# Patient Record
Sex: FEMALE | Race: WHITE | Employment: FULL TIME | URBAN - METROPOLITAN AREA
[De-identification: names, ages, dates, MRNs, and addresses within clinical notes are randomized per-mention and may not be internally consistent; named-entity substitution may affect disease eponyms.]

---

## 2017-01-16 ENCOUNTER — OFFICE VISIT (OUTPATIENT)
Dept: NEUROLOGY | Age: 57
End: 2017-01-16

## 2017-01-16 ENCOUNTER — TELEPHONE (OUTPATIENT)
Dept: NEUROLOGY | Age: 57
End: 2017-01-16

## 2017-01-16 VITALS
HEIGHT: 63 IN | TEMPERATURE: 97.5 F | OXYGEN SATURATION: 98 % | BODY MASS INDEX: 27.71 KG/M2 | SYSTOLIC BLOOD PRESSURE: 122 MMHG | RESPIRATION RATE: 16 BRPM | DIASTOLIC BLOOD PRESSURE: 78 MMHG | WEIGHT: 156.4 LBS | HEART RATE: 81 BPM

## 2017-01-16 DIAGNOSIS — R41.3 MEMORY LOSS: ICD-10-CM

## 2017-01-16 DIAGNOSIS — H93.25 AUDITORY PROCESSING DISORDER: ICD-10-CM

## 2017-01-16 DIAGNOSIS — R41.3 MEMORY LOSS: Primary | ICD-10-CM

## 2017-01-16 DIAGNOSIS — H93.25 AUDITORY PROCESSING DISORDER: Primary | ICD-10-CM

## 2017-01-16 RX ORDER — VITAMIN E 268 MG
CAPSULE ORAL
COMMUNITY

## 2017-01-16 RX ORDER — THYROID,PORK 130 MG
130 TABLET ORAL
Refills: 1 | COMMUNITY
Start: 2017-01-09 | End: 2019-03-18

## 2017-01-16 RX ORDER — CHOLECALCIFEROL (VITAMIN D3) 125 MCG
CAPSULE ORAL DAILY
COMMUNITY

## 2017-01-16 RX ORDER — SELENIUM 100 MCG
TABLET ORAL DAILY
COMMUNITY

## 2017-01-16 NOTE — PROGRESS NOTES
[address] [facility]. [address]   [place] [address] [facility] [address] [place], [facility]    [facility] [place]   [phone] Fax             Referring: Marissa Torres MD      Chief Complaint   Patient presents with    Memory Loss     new patient     59-year-old right-handed woman who presents today for evaluation of memory loss. She says that her symptoms began about 10 years ago when she noticed decline in her ability to function cognitively. She says that her  believes over the years she is about the same but she perceives that she is worse. Back in 2008 she had a neuropsych evaluation which did show a mixed normal and abnormal result demonstrating some auditory processing issues. She has not had that repeated. She has not had her B12 checked. She says that she repeats the same question until the same story. She gives example today that she prepared for this appointment by putting in the address and her GPS device and she says she was on her way here when she realized she did not know where she was going and she did not have her GPS on. She then had to figure out how to turn on the GPS and get things working so that she could get here. She says that she goes to the grocery store and does not have it written down she will not buy it. 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 She has trouble with computer applications. She has trouble with higher level math. She works as a  and she works with  first and third grade students. She says there is a  position open but she is not able to fulfill the duties secondary to her cognitive issues. She says her family is frustrated and getting increasingly frustrated. She denies any focal weakness. No loss or alteration in consciousness.   She did have her thyroid medication adjusted recently as she was having some palpitations. Her levels were too high and the medication was decreased. Her thyroid was resected in 1995 secondary to a question of thyroid cancer but the pathology came back normal.  She had a question of obstructive sleep apnea had a equivocal polysomnogram per her report and she lost 80 pounds and had subsequent testing performed and she does not have obstructive sleep apnea. She does have long-standing insomnia and she has tried multiple medications and seen at least to sleep doctors but nothing has helped her. Past Medical History   Diagnosis Date    Headache     Hypertension     Hypothyroidism     Insomnia     Melanoma ([address]     Muscle pain        Past Surgical History   Procedure Laterality Date    Hx cholecystectomy      Hx appendectomy      Hx heent      Hx gyn         Current Outpatient Prescriptions   Medication Sig Dispense Refill    vitamin E (AQUA GEMS) 400 unit capsule Take  by mouth. 4 times a week      cholecalciferol, vitamin D3, (VITAMIN D3) 2,000 unit tab Take  by mouth daily.  selenium 100 mcg tab Take  by mouth daily.  WP THYROID 130 mg tab 130 mg. 1/2 tab bid  1    multivitamin (ONE A DAY) tablet Take 1 Tab by mouth daily.  magnesium oxide (MAG-OX) 400 mg tablet Take 400 mg by mouth daily.  levothyroxine (SYNTHROID) 112 mcg tablet Take  by mouth Daily (before breakfast).  krill-omega-3-dha-epa-lipids 519-61-36-16 mg cap Take  by mouth.  liothyronine (CYTOMEL) 5 mcg tablet Take 5 mcg by mouth two (2) times a day.           Allergies   Allergen Reactions    Hydrochlorothiazide (Bulk) Other (comments)     Makes bp elevated & alters thyroid levels    Codeine Nausea Only       Social History   Substance Use Topics    Smoking status: Former Smoker    Smokeless tobacco: Never Used    Alcohol use 0.0 oz/week     0 Standard drinks or equivalent per week      Comment: 2x per year       Family History   Problem Relation Age of Onset    Dementia Mother     Coronary Artery Disease Father     Heart Attack Father     Cancer Father     Heart Disease Father     Stroke Father     Breast Cancer Maternal Aunt     Parkinson's Disease Maternal Aunt     Mental Retardation Sister     Heart Disease Maternal Grandmother     Stroke Maternal Grandmother     Heart Disease Maternal Grandfather     Heart Disease Paternal Grandmother     Stroke Paternal Grandmother     Heart Disease Paternal Grandfather     Headache Other      Review of Systems  As above. Remainder of comprehensive ROS is negative    Examination  Visit Vitals    /78    Pulse 81    Temp 97.5 °F (36.4 °C)    Resp 16    Ht 5' 3\" (1.6 m)    Wt 70.9 kg (156 lb 6.4 oz)    SpO2 98%    BMI 27.71 kg/m2     Pleasant, well appearing. No icterus. Oropharynx clear. Supple neck without bruit. Heart regular. No murmur. No edema. Neurologically, she is awake, alert, and oriented with normal speech and language. She is able to discuss her medical history. She is able to discuss her medications. She is able to discuss current events. Intact cranial nerves 2-12. No nystagmus. Visual fields full to confrontation. Disk margins are flat bilaterally. She has normal bulk and tone. She has no abnormal movement. She has no pronation or drift. She generates full strength in the upper and lower extremities to direct confrontational testing. Reflexes are symmetrical in the upper and lower extremities bilaterally. Her toes are down bilaterally. No Rich. Finger nose finger and rapid alternating movements are normal.  Steady gait. No sensory deficit to primary modalities.       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 At this point given the period of time between her first evaluation and now we will proceed with an MRI of the brain looking for anatomic abnormalities in the temporal lobe or the frontal lobe specifically evaluating the hippocampus as well as the frontal region for evidence of any anatomic abnormality that would predispose her to executive functioning/memory issues as well as the auditory association cortex. Additionally, we will proceed with an EEG as well as carotid Doppler. She has not had a B12 checked we will check that as well. Repeat Neuropsych testing. Follow after testing completing    This note was created using voice recognition software. Despite editing, there may be syntax errors. This note will not be viewable in [address]

## 2017-01-16 NOTE — TELEPHONE ENCOUNTER
Patient was here and wanted to know if Dr. Aditya Rosales sent in anything she could take before going in for her MRI due to [address]

## 2017-01-16 NOTE — PATIENT INSTRUCTIONS
Information Regarding Testing     If you have physican order for a test or a medication denied by your insurance company, this does not mean the test or medication is not appropriate for you as that is a medical decision, not a decision to be made by an insurance company representative or by an [facility] physician who has not interviewed and examined you. This is a decision to be made between you and your physician. The denial of services is a contractual matter between you and your insurance company, not an issue between your physician and the insurance company. If your test or medication is denied, you can take the following steps to help resolve the issue:    1. File a complaint with the [facility] regarding your insurance company's denial of services ordered for you. You can do this either by calling them directly or by completing an on-line complaint form on the [employer]. This can be found at [email or web address]    2. Also file a formal complaint with your insurance company and ask to have the name of the person denying the service so that you may explore a legal option should you be harmed by this denial of service. Again, the fact the insurance company will not pay for the service does not mean it is not medically necessary and I would encourage you to follow through with the plan that was made with your physician    3. File a written complaint with your employer so your employer and benefit manager is aware of the poor coverage they are providing their employees. If you have medicare/medicaid, complain to your representative in the House and to your Doroteo Faustin.         [facility]   Statement to Patients  April 1, 2014      In an effort to ensure the large volume of patient prescription refills is processed in the most efficient and expeditious manner, we are asking our patients to assist us by calling your Pharmacy for all prescription refills, this will include also your  Mail Order Pharmacy. The pharmacy will contact our office electronically to continue the refill process. Please do not wait until the last minute to call your pharmacy. We need at least 48 hours (2days) to fill prescriptions. We also encourage you to call your pharmacy before going to  your prescription to make sure it is ready. With regard to controlled substance prescription refill requests (narcotic refills) that need to be picked up at our office, we ask your cooperation by providing us with at least 72 hours (3days) notice that you will need a refill. We will not refill narcotic prescription refill requests after 4:00pm on any weekday, Monday through Thursday, or after 2:00pm on Fridays, or on the weekends. We encourage everyone to explore another way of getting your prescription refill request processed using [email or web address], our patient web portal through our electronic medical record system. [email or web address] is an efficient and effective way to communicate your medication request directly to the office and  downloadable as an vitkoriya on your smart phone . [email or web address] also features a review functionality that allows you to view your medication list as well as leave messages for your physician. Are you ready to get connected? If so please review the attatched instructions or speak to any of our staff to get you set up right away! Thank you so much for your cooperation. Should you have any questions please contact our Practice Administrator. The Physicians and Staff,  [facility]         If we have ordered testing for you, we do not call patients with results and we do not give test results over the phone. We schedule follow up appointments so that your results can be discussed in person and any questions you have regarding them may be addressed.   If something of concern is revealed on your test, we will call you for a sooner follow up appointment. Additionally, results may be found by using the My Chart feature and one of our patient service representatives at the  can give you instructions on how to access this feature of our electronic medical record system. Thank you for choosing [facility] for your     care. You may receive a survey about your visit. We appreciate you taking time     to complete this survey as we use your feedback to improve our services. We     realize we are not perfect, but we strive to provide excellent care. Learning About Living Perroy  What is a living will? A living will is a legal form you use to write down the kind of care you want at the end of your life. It is used by the health professionals who will treat you if you aren't able to decide for yourself. If you put your wishes in writing, your loved ones and others will know what kind of care you want. They won't need to guess. This can ease your mind and be helpful to others. A living will is not the same as an estate or property will. An estate will explains what you want to happen with your money and property after you die. Is a living will a legal document? A living will is a legal document. Each state has its own laws about living green. If you move to another state, make sure that your living will is legal in the state where you now live. Or you might use a universal form that has been approved by many states. This kind of form can sometimes be completed and stored online. Your electronic copy will then be available wherever you have a connection to the Internet. In most cases, doctors will respect your wishes even if you have a form from a different state. · You don't need an  to complete a living will.  But legal advice can be helpful if your state's laws are unclear, your health history is complicated, or your family can't agree on what should be in your living will.  · You can change your living will at any time. Some people find that their wishes about end-of-life care change as their health changes. · In addition to making a living will, think about completing a medical power of  form. This form lets you name the person you want to make end-of-life treatment decisions for you (your \"health care agent\") if you're not able to. Many hospitals and nursing homes will give you the forms you need to complete a living will and a medical power of . · Your living will is used only if you can't make or communicate decisions for yourself anymore. If you become able to make decisions again, you can accept or refuse any treatment, no matter what you wrote in your living will. · Your state may offer an online registry. This is a place where you can store your living will online so the doctors and nurses who need to treat you can find it right away. What should you think about when creating a living will? Talk about your end-of-life wishes with your family members and your doctor. Let them know what you want. That way the people making decisions for you won't be surprised by your choices. Think about these questions as you make your living will:  · Do you know enough about life support methods that might be used? If not, talk to your doctor so you know what might be done if you can't breathe on your own, your heart stops, or you're unable to swallow. · What things would you still want to be able to do after you receive life-support methods? Would you want to be able to walk? To speak? To eat on your own? To live without the help of machines? · If you have a choice, where do you want to be cared for? In your home? At a hospital or nursing home? · Do you want certain Congregational practices performed if you become very ill? · If you have a choice at the end of your life, where would you prefer to die? At home? In a hospital or nursing home? Somewhere else?   · Would you prefer to be buried or cremated? · Do you want your organs to be donated after you die? What should you do with your living will? · Make sure that your family members and your health care agent have copies of your living will. · Give your doctor a copy of your living will to keep in your medical record. If you have more than one doctor, make sure that each one has a copy. · You may want to put a copy of your living will where it can be easily found. Where can you learn more? Go to http://marko-francoise.info/. Enter S490 in the search box to learn more about \"Learning About Living Perroy. \"  Current as of: February 24, 2016  Content Version: 11.1  © 9048-7847 [employer]. Care instructions adapted under license by [employer] (which disclaims liability or warranty for this information). If you have questions about a medical condition or this instruction, always ask your healthcare professional. [address] any warranty or liability for your use of this information.

## 2017-01-16 NOTE — PROGRESS NOTES
New patient presenting with memory loss. Has seen Dr Daksha Prescott in 2008 and had neuropsych testing. Patient reports difficulty retaining and processing information. Short term memory difficulties.

## 2017-01-16 NOTE — MR AVS SNAPSHOT
Visit Information Date & Time Provider Department Dept. Phone Encounter #  
 1/16/2017  8:00 AM Rufus Fitzpatrick MD Neurology [facility] [place]  Follow-up Instructions Return for After tests. Upcoming Health Maintenance Date Due Hepatitis C Screening 1960 DTaP/Tdap/Td series (1 - Tdap) 10/6/1981 PAP AKA CERVICAL CYTOLOGY 10/6/1981 FOBT Q 1 YEAR AGE 50-75 10/6/2010 INFLUENZA AGE 9 TO ADULT 8/1/2016 BREAST CANCER SCRN MAMMOGRAM 10/18/2018 Allergies as of 1/16/2017  Review Complete On: 1/16/2017 By: Rufus Fitzpatrick MD  
  
 Severity Noted Reaction Type Reactions Hydrochlorothiazide (Bulk)  02/06/2012    Other (comments) Makes bp elevated & alters thyroid levels Codeine Low 02/06/2012    Nausea Only Current Immunizations  Never Reviewed No immunizations on file. Not reviewed this visit You Were Diagnosed With   
  
 Codes Comments Memory loss    -  Primary ICD-10-CM: R41.3 ICD-9-CM: 780.93 Auditory processing disorder     ICD-10-CM: H93.25 ICD-9-CM: 388.45 Vitals BP Pulse Temp Resp Height(growth percentile) Weight(growth percentile) 122/78 81 97.5 °F (36.4 °C) 16 5' 3\" (1.6 m) 156 lb 6.4 oz (70.9 kg) SpO2 BMI Smoking Status 98% 27.71 kg/m2 Former Smoker BMI and BSA Data Body Mass Index Body Surface Area  
 27.71 kg/m 2 1.78 m 2 Preferred Pharmacy Pharmacy Name Phone [id number], [facility] [facility] [address] [phone] Your Updated Medication List  
  
   
This list is accurate as of: 1/16/17  8:28 AM.  Always use your most recent med list.  
  
  
  
  
 krill-omega-3-dha-epa-lipids 357-32-49-50 mg Cap Take  by mouth.  
  
 levothyroxine 112 mcg tablet Commonly known as:  SYNTHROID Take  by mouth Daily (before breakfast). liothyronine 5 mcg tablet Commonly known as:  CYTOMEL Take 5 mcg by mouth two (2) times a day. magnesium oxide 400 mg tablet Commonly known as:  MAG-OX Take 400 mg by mouth daily. multivitamin tablet Commonly known as:  ONE A DAY Take 1 Tab by mouth daily. selenium 100 mcg Tab Take  by mouth daily. VITAMIN D3 2,000 unit Tab Generic drug:  cholecalciferol (vitamin D3) Take  by mouth daily. vitamin E 400 unit capsule Commonly known as:  [address] Take  by mouth. 4 times a week WP THYROID 130 mg Tab Generic drug:  Thyroid (Pork) 130 mg. 1/2 tab bid We Performed the Following REFERRAL TO NEUROPSYCHOLOGY [PSY08 Custom] Comments:  
 Please evaluate patient for cognitive decline--prev test 2008 in scanned media VITAMIN B12 [id number] CPT(R)] Follow-up Instructions Return for After tests. To-Do List   
 01/16/2017 Imaging:  DUPLEX CAROTID BILATERAL AMB NEURO   
  
 01/16/2017 Neurology:  EEG   
  
 01/16/2017 Imaging:  MRI BRAIN W WO CONT Referral Information Referral ID Referred By Referred To  
  
 [id number] BINH, [address], PsyD [place] [address] [facility], [address] [facility] Phone: [phone] Fax: [phone] Visits Status Start Date End Date 1 New Request 1/16/17 1/16/18 If your referral has a status of pending review or denied, additional information will be sent to support the outcome of this decision. Patient Instructions Information Regarding Testing If you have physican order for a test or a medication denied by your insurance company, this does not mean the test or medication is not appropriate for you as that is a medical decision, not a decision to be made by an insurance company representative or by an [facility] physician who has not interviewed and examined you. This is a decision to be made between you and your physician. The denial of services is a contractual matter between you and your insurance company, not an issue between your physician and the insurance company. If your test or medication is denied, you can take the following steps to help resolve the issue: 1. File a complaint with the [facility] of Insurance regarding your insurance company's denial of services ordered for you. You can do this either by calling them directly or by completing an on-line complaint form on the [employer]. This can be found at [email or web address]" 2. Also file a formal complaint with your insurance company and ask to have the name of the person denying the service so that you may explore a legal option should you be harmed by this denial of service. Again, the fact the insurance company will not pay for the service does not mean it is not medically necessary and I would encourage you to follow through with the plan that was made with your physician 3. File a written complaint with your employer so your employer and benefit manager is aware of the poor coverage they are providing their employees. If you have medicare/medicaid, complain to your representative in the House and to your Doroteo Faustin. PRESCRIPTION REFILL POLICY [facility] Statement to Patients April 1, 2014 In an effort to ensure the large volume of patient prescription refills is processed in the most efficient and expeditious manner, we are asking our patients to assist us by calling your Pharmacy for all prescription refills, this will include also your  Mail Order Pharmacy. The pharmacy will contact our office electronically to continue the refill process. Please do not wait until the last minute to call your pharmacy. We need at least 48 hours (2days) to fill prescriptions. We also encourage you to call your pharmacy before going to  your prescription to make sure it is ready. With regard to controlled substance prescription refill requests (narcotic refills) that need to be picked up at our office, we ask your cooperation by providing us with at least 72 hours (3days) notice that you will need a refill. We will not refill narcotic prescription refill requests after 4:00pm on any weekday, Monday through Thursday, or after 2:00pm on Fridays, or on the weekends. We encourage everyone to explore another way of getting your prescription refill request processed using [employer], our patient web portal through our electronic medical record system. [employer] is an efficient and effective way to communicate your medication request directly to the office and  downloadable as an viktoriya on your smart phone . [employer] also features a review functionality that allows you to view your medication list as well as leave messages for your physician. Are you ready to get connected? If so please review the attatched instructions or speak to any of our staff to get you set up right away! Thank you so much for your cooperation. Should you have any questions please contact our Practice Administrator. The Physicians and Staff,  [facility] If we have ordered testing for you, we do not call patients with results and we do not give test results over the phone. We schedule follow up appointments so that your results can be discussed in person and any questions you have regarding them may be addressed. If something of concern is revealed on your test, we will call you for a sooner follow up appointment. Additionally, results may be found by using the My Chart feature and one of our patient service representatives at the  can give you instructions on how to access this feature of our electronic medical record system. Thank you for choosing [facility] and [facility] for your  
 
care. You may receive a survey about your visit.   We appreciate you taking time  
 
to complete this survey as we use your feedback to improve our services. We  
 
realize we are not perfect, but we strive to provide excellent care. Merlin Adam [address] What is a living will? A living will is a legal form you use to write down the kind of care you want at the end of your life. It is used by the health professionals who will treat you if you aren't able to decide for yourself. If you put your wishes in writing, your loved ones and others will know what kind of care you want. They won't need to guess. This can ease your mind and be helpful to others. A living will is not the same as an estate or property will. An estate will explains what you want to happen with your money and property after you die. Is a living will a legal document? A living will is a legal document. Each state has its own laws about living green. If you move to another state, make sure that your living will is legal in the state where you now live. Or you might use a universal form that has been approved by many states. This kind of form can sometimes be completed and stored online. Your electronic copy will then be available wherever you have a connection to the Internet. In most cases, doctors will respect your wishes even if you have a form from a different state. · You don't need an  to complete a living will. But legal advice can be helpful if your state's laws are unclear, your health history is complicated, or your family can't agree on what should be in your living will. · You can change your living will at any time. Some people find that their wishes about end-of-life care change as their health changes. · In addition to making a living will, think about completing a medical power of  form.  This form lets you name the person you want to make end-of-life treatment decisions for you (your \"health care agent\") if you're not able to. Many hospitals and nursing homes will give you the forms you need to complete a living will and a medical power of . · Your living will is used only if you can't make or communicate decisions for yourself anymore. If you become able to make decisions again, you can accept or refuse any treatment, no matter what you wrote in your living will. · Your state may offer an online registry. This is a place where you can store your living will online so the doctors and nurses who need to treat you can find it right away. What should you think about when creating a living will? Talk about your end-of-life wishes with your family members and your doctor. Let them know what you want. That way the people making decisions for you won't be surprised by your choices. Think about these questions as you make your living will: · Do you know enough about life support methods that might be used? If not, talk to your doctor so you know what might be done if you can't breathe on your own, your heart stops, or you're unable to swallow. · What things would you still want to be able to do after you receive life-support methods? Would you want to be able to walk? To speak? To eat on your own? To live without the help of machines? · If you have a choice, where do you want to be cared for? In your home? At a hospital or nursing home? · Do you want certain Hoahaoism practices performed if you become very ill? · If you have a choice at the end of your life, where would you prefer to die? At home? In a hospital or nursing home? Somewhere else? · Would you prefer to be buried or cremated? · Do you want your organs to be donated after you die? What should you do with your living will? · Make sure that your family members and your health care agent have copies of your living will.  
· Give your doctor a copy of your living will to keep in your medical record. If you have more than one doctor, make sure that each one has a copy. · You may want to put a copy of your living will where it can be easily found. Where can you learn more? Go to http://marko-francoise.info/. Enter H664 in the search box to learn more about \"Learning About Living Tereza. \" Current as of: February 24, 2016 Content Version: 11.1 © 9631-6310 [employer]. Care instructions adapted under license by [employer] (which disclaims liability or warranty for this information). If you have questions about a medical condition or this instruction, always ask your healthcare professional. [address] any warranty or liability for your use of this information. Introducing [facility]! Tahira Alonso introduces [employer] patient portal. Now you can access parts of your medical record, email your doctor's office, and request medication refills online. 1. In your internet browser, go to [email or web address]/[email or web address] 2. Click on the First Time User? Click Here link in the Sign In box. You will see the New Member Sign Up page. 3. Enter your [employer] Access Code exactly as it appears below. You will not need to use this code after youve completed the sign-up process. If you do not sign up before the expiration date, you must request a new code. · [employer] Access Code: [email or web address] Expires: 4/16/2017  8:28 AM 
 
4. Enter the last four digits of your Social Security Number (xxxx) and Date of Birth (mm/dd/yyyy) as indicated and click Submit. You will be taken to the next sign-up page. 5. Create a [employer] ID. This will be your [employer] login ID and cannot be changed, so think of one that is secure and easy to remember. 6. Create a [employer] password. You can change your password at any time. 7. Enter your Password Reset Question and Answer. This can be used at a later time if you forget your password. 8. Enter your e-mail address. You will receive e-mail notification when new information is available in [address] 9. Click Sign Up. You can now view and download portions of your medical record. 10. Click the Download Summary menu link to download a portable copy of your medical information. If you have questions, please visit the Frequently Asked Questions section of the [employer] website. Remember, [employer] is NOT to be used for urgent needs. For medical emergencies, dial 911. Now available from your iPhone and Android! Please provide this summary of care documentation to your next provider. Your primary care clinician is listed as Edwar Wheatley. If you have any questions after today's visit, please call [phone].

## 2017-01-17 RX ORDER — DIAZEPAM 10 MG/1
TABLET ORAL
Qty: 1 TAB | Refills: 0 | OUTPATIENT
Start: 2017-01-17 | End: 2017-06-27

## 2017-01-17 NOTE — TELEPHONE ENCOUNTER
Spoke to patient and informed her per Dr Gloria Lay new order for Valium prior to MRI. Patient request script be sent to [facility]. VO called to pharmacy. Spoke to Notice.

## 2017-01-26 ENCOUNTER — HOSPITAL ENCOUNTER (OUTPATIENT)
Dept: MRI IMAGING | Age: 57
Discharge: HOME | End: 2017-01-26
Attending: PSYCHIATRY & NEUROLOGY
Payer: COMMERCIAL

## 2017-01-26 ENCOUNTER — HOSPITAL ENCOUNTER (OUTPATIENT)
Dept: NEUROLOGY | Age: 57
Discharge: HOME | End: 2017-01-26
Attending: PSYCHIATRY & NEUROLOGY
Payer: COMMERCIAL

## 2017-01-26 DIAGNOSIS — H93.25 AUDITORY PROCESSING DISORDER: ICD-10-CM

## 2017-01-26 DIAGNOSIS — R41.3 MEMORY LOSS: ICD-10-CM

## 2017-01-26 PROCEDURE — 70553 MRI BRAIN STEM W/O & W/DYE: CPT

## 2017-01-26 PROCEDURE — A9585 GADOBUTROL INJECTION: HCPCS | Performed by: RADIOLOGY

## 2017-01-26 PROCEDURE — 95816 EEG AWAKE AND DROWSY: CPT

## 2017-01-26 PROCEDURE — 74011250636 HC RX REV CODE- 250/636: Performed by: RADIOLOGY

## 2017-01-26 RX ADMIN — GADOBUTROL 6.94 ML: 604.72 INJECTION INTRAVENOUS at 15:07

## 2017-01-31 NOTE — PROCEDURES
Carotid Doppler:      Date:  01/16/17    Requesting Physician:  Carlos Alberto Brandt. MD Little     Indication:  Stenosis. B-mode imaging reveals mild plaque at the bifurcations extending into the internal carotid artery segments bilaterally. Doppler spectral analysis reveals no significantly elevated velocities. Vertebral artery flow antegrade bilaterally. Interpretation:  Plaque as noted. Stenosis estimated at 1-15% bilateral ICA.

## 2017-01-31 NOTE — PROCEDURES
[facility] [place] [address]   [address] [facility], [address]   ROUTINE EEG REPORT       Name:  Horace Berman   MR#:  [id number]   :  1960   Account #:  [de-identified]    Date of Procedure:  2017   Date of Adm:  2017       REQUESTING PHYSICIAN: Musa Fitch MD    INDICATIONS: An EEG is requested in this 51-year-old woman with   confusional spells, to evaluate for epileptiform abnormalities. MEDICATIONS: Leodis Leash to be thyroid. DESCRIPTION: This tracing is obtained during the awake and drowsy   states. During wakefulness, there are very brief intermittent runs of   posteriorly dominant and symmetrical low to medium amplitude 9-10   cycle/sec activities, which attenuate with eye opening. Lower voltage,   faster frequency activities are seen symmetrically over the anterior   head regions. Hyperventilation is performed for 3 minutes with excellent effort and   little alters of the tracing. Intermittent photic stimulation induces symmetric posterior driving   responses. During drowsiness, the background rhythms attenuate and are   replaced with diffuse symmetric theta range activities. There is the later   emergence of symmetric vertex sharp waves. Later stages of sleep are   not obtained. INTERPRETATION: This EEG recorded during the awake and drowsy   states is normal. No epileptiform abnormalities are seen.         MD HUGO Shepard / Stalin.Kassie   D:  2017   08:34   T:  2017   01:20   Job #:  [id number]

## 2017-02-03 ENCOUNTER — TELEPHONE (OUTPATIENT)
Dept: MEDSURG UNIT | Age: 57
End: 2017-02-03

## 2017-02-03 NOTE — TELEPHONE ENCOUNTER
Pt needs to know the result for her MRI and EEG test please. Pt has the CD for her MRI and she does not like to rodger until Dr see her. Please call to advice [phone] .  Thank you

## 2017-02-08 ENCOUNTER — TELEPHONE (OUTPATIENT)
Dept: NEUROLOGY | Age: 57
End: 2017-02-08

## 2017-04-17 ENCOUNTER — OFFICE VISIT (OUTPATIENT)
Dept: NEUROLOGY | Age: 57
End: 2017-04-17

## 2017-04-17 DIAGNOSIS — G31.84 MILD COGNITIVE IMPAIRMENT: Primary | ICD-10-CM

## 2017-04-17 DIAGNOSIS — F43.23 ADJUSTMENT DISORDER WITH MIXED ANXIETY AND DEPRESSED MOOD: ICD-10-CM

## 2017-04-17 NOTE — PROGRESS NOTES
[address] [facility],[address]  [address] [facility] [address] [facility]   Margarita Joseph   [phone] Office   [phone] Fax      Neuropsychology    Initial Diagnostic Interview Note      Referral:  Derik Gaona MD,  Iantha Gaucher is a 64 y.o. right handed   female who was unaccompanied to the initial clinical interview on 4/17/17 . Please refer to her medical records for details pertaining to her history. Briefly, the patient reported that she completed a Bachelor's Degree and additional Master's level coursework as well. No history of previously diagnosed LD and/or receipt of special education services. She is a . She has been having memory issues progressively worsening since about 2006. Her family members don't really notice. Spouse wrote a letter which I also reviewed. She is trying to figure if there is anything she is doing or be on. She had testing done in 2008 and I was a post doc at that time and participated in her evaluation. She had auditory memory/auditory processing issues. She forgets the content of conversations. Misplaces things. Starts tasks and does not complete. Getting here today - had a GPS on and still got lost coming here, and it is the second time getting here. She asks the same question over and over. She says that she goes to the grocery store and does not have it written down she will not buy it. 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 Her family is frustrated and are increasingly so. She is independent for medications, finances, day-to-day chores, ADLs, etc.  No balance issues. No changes in sense of taste or smell. She denies any focal weakness. No loss or alteration in consciousness. She did have her thyroid medication adjusted recently as she was having some palpitations. Her levels were too high and the medication was decreased. Her thyroid was resected in 1995 secondary to a question of thyroid cancer but the pathology came back normal. She had a question of obstructive sleep apnea had a equivocal polysomnogram per her report and she lost 80 pounds and had subsequent testing performed and she does not have obstructive sleep apnea. She does have long-standing insomnia and she has tried multiple medications and seen at least to sleep doctors but nothing has helped her. Still continues to sleep poorly despite various meds and sleep studies. Tino Remedies, she is doing mostly okay, stressed/anxious about her memory and gets down about it at times. No major psych history though. No counseling or psychiatrist.  Appetite is fine. Spouse notes that she has a hard time using a DVD player and has difficulty remembering things he says. Family history of likely dementia. Conversations with spouse are frustrating because she doesn't remember things he tells her. This has been going on for quite some time.       MMSE: 28/30 with recall 1/3 and 2/3 with cue    Clock: Normal     TOPF:  42    Historian: Good  Praxis: No UE apraxia  R/L Orientation: Intact to self and to other  Dress: within normal limits   Weight: within normal limits   Appearance/Hygiene: within normal limits   Gait: within normal limits   Assistive Devices: Glasses  Mood: within normal limits   Affect: within normal limits   Comprehension: within normal limits   Thought Process: within normal limits   Expressive Language: within normal limits   Receptive Language: within normal limits   Motor:  No cognitive or motor perseveration  ETOH: Occasionally not to excess, holidays and such  Tobacco: Denied  Illicit: Denied  SI/HI: Denied  Psychosis: Denied  Insight: Within normal limits  Judgment: Within normal limits  Other Psych:      Past Medical History:   Diagnosis Date    Headache     Hypertension     Hypothyroidism     Insomnia     Melanoma ([facility] [address]     Muscle pain        Past Surgical History:   Procedure Laterality Date    HX APPENDECTOMY      HX CHOLECYSTECTOMY      HX GYN      HX HEENT         Allergies   Allergen Reactions    Latex Unknown (comments)    Contrast Agent [Iodine] Other (comments)     Causes high blood pressure and dizziness    Hydrochlorothiazide (Bulk) Other (comments)     Makes bp elevated & alters thyroid levels    Codeine Nausea Only       Family History   Problem Relation Age of Onset    Dementia Mother     Coronary Artery Disease Father     Heart Attack Father     Cancer Father     Heart Disease Father     Stroke Father     Breast Cancer Maternal Aunt     Parkinson's Disease Maternal Aunt     Mental Retardation Sister     Heart Disease Maternal Grandmother     Stroke Maternal Grandmother     Heart Disease Maternal Grandfather     Heart Disease Paternal Grandmother     Stroke Paternal Grandmother     Heart Disease Paternal Grandfather     Headache Other        Social History   Substance Use Topics    Smoking status: Former Smoker    Smokeless tobacco: Never Used    Alcohol use 0.0 oz/week     0 Standard drinks or equivalent per week      Comment: 2x per year       Current Outpatient Prescriptions   Medication Sig Dispense Refill    diazePAM (VALIUM) 10 mg tablet Take 1 tab PO 45 minutes prior to MRI. Must have . 1 Tab 0    vitamin E (AQUA GEMS) 400 unit capsule Take  by mouth. 4 times a week      cholecalciferol, vitamin D3, (VITAMIN D3) 2,000 unit tab Take  by mouth daily.  selenium 100 mcg tab Take  by mouth daily.  WP THYROID 130 mg tab 130 mg. 1/2 tab bid  1    multivitamin (ONE A DAY) tablet Take 1 Tab by mouth daily.  magnesium oxide (MAG-OX) 400 mg tablet Take 400 mg by mouth daily.  levothyroxine (SYNTHROID) 112 mcg tablet Take  by mouth Daily (before breakfast).  krill-omega-3-dha-epa-lipids 791-87-84-47 mg cap Take  by mouth.       liothyronine (CYTOMEL) 5 mcg tablet Take 5 mcg by mouth two (2) times a day. Plan:  Obtain authorization for testing from insurance company. Report to follow once testing, scoring, and interpretation completed. ? Organic based neurocognitive issues versus mood disorder or combination of same. ? Problems organic, functional, or both? This note will not be viewable in [address]

## 2017-04-21 ENCOUNTER — OFFICE VISIT (OUTPATIENT)
Dept: NEUROLOGY | Age: 57
End: 2017-04-21

## 2017-04-21 DIAGNOSIS — F43.22 ADJUSTMENT DISORDER WITH ANXIETY: ICD-10-CM

## 2017-04-21 DIAGNOSIS — G31.84 MILD COGNITIVE IMPAIRMENT WITH MEMORY LOSS: Primary | ICD-10-CM

## 2017-04-28 NOTE — PROGRESS NOTES
[address] [facility]  [address] [facility]   [facility] [address] [facility]   Ange Joseph   [phone] Office   [phone] Fax      Neuropsychological Evaluation Report    Referral:  Ashlee Hankins MD, Dr. Calhoun Charity is a 64 y.o. right handed   female who was unaccompanied to the initial clinical interview on 4/17/17 . Please refer to her medical records for details pertaining to her history. Briefly, the patient reported that she completed a Bachelor's Degree and additional Master's level coursework as well. No history of previously diagnosed LD and/or receipt of special education services. She is a . She has been having memory issues progressively worsening since about 2006. Her family members don't really notice. Spouse wrote a letter which I also reviewed. She is trying to figure if there is anything she is doing or be on. She had testing done in 2008 and I was a post doc at that time and participated in her evaluation. She had auditory memory/auditory processing issues. She forgets the content of conversations. Misplaces things. Starts tasks and does not complete. Getting here today - had a GPS on and still got lost coming here, and it is the second time getting here. She asks the same question over and over. She says that she goes to the grocery store and does not have it written down she will not buy it. 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 Her family is frustrated and are increasingly so. She is independent for medications, finances, day-to-day chores, ADLs, etc.  No balance issues. No changes in sense of taste or smell. She denies any focal weakness. No loss or alteration in consciousness. She did have her thyroid medication adjusted recently as she was having some palpitations.  Her levels were too high and the medication was decreased. Her thyroid was resected in 1995 secondary to a question of thyroid cancer but the pathology came back normal. She had a question of obstructive sleep apnea had a equivocal polysomnogram per her report and she lost 80 pounds and had subsequent testing performed and she does not have obstructive sleep apnea. She does have long-standing insomnia and she has tried multiple medications and seen at least to sleep doctors but nothing has helped her. Still continues to sleep poorly despite various meds and sleep studies. Ed Setters, she is doing mostly okay, stressed/anxious about her memory and gets down about it at times. No major psych history though. No counseling or psychiatrist.  Appetite is fine. Spouse notes that she has a hard time using a DVD player and has difficulty remembering things he says. Family history of likely dementia. Conversations with spouse are frustrating because she doesn't remember things he tells her. This has been going on for quite some time.       MMSE: 28/30 with recall 1/3 and 2/3 with cue    Clock: Normal     TOPF:  42    Historian: Good  Praxis: No UE apraxia  R/L Orientation: Intact to self and to other  Dress: within normal limits   Weight: within normal limits   Appearance/Hygiene: within normal limits   Gait: within normal limits   Assistive Devices: Glasses  Mood: within normal limits   Affect: within normal limits   Comprehension: within normal limits   Thought Process: within normal limits   Expressive Language: within normal limits   Receptive Language: within normal limits   Motor:  No cognitive or motor perseveration  ETOH: Occasionally not to excess, holidays and such  Tobacco: Denied  Illicit: Denied  SI/HI: Denied  Psychosis: Denied  Insight: Within normal limits  Judgment: Within normal limits  Other Psych:      Past Medical History:   Diagnosis Date    Headache     Hypertension     Hypothyroidism     Insomnia     Melanoma ([facility] [address]     Muscle pain        Past Surgical History:   Procedure Laterality Date    HX APPENDECTOMY      HX CHOLECYSTECTOMY      HX GYN      HX HEENT         Allergies   Allergen Reactions    Latex Unknown (comments)    Contrast Agent [Iodine] Other (comments)     Causes high blood pressure and dizziness    Hydrochlorothiazide (Bulk) Other (comments)     Makes bp elevated & alters thyroid levels    Codeine Nausea Only       Family History   Problem Relation Age of Onset    Dementia Mother     Coronary Artery Disease Father     Heart Attack Father     Cancer Father     Heart Disease Father     Stroke Father     Breast Cancer Maternal Aunt     Parkinson's Disease Maternal Aunt     Mental Retardation Sister     Heart Disease Maternal Grandmother     Stroke Maternal Grandmother     Heart Disease Maternal Grandfather     Heart Disease Paternal Grandmother     Stroke Paternal Grandmother     Heart Disease Paternal Grandfather     Headache Other        Social History   Substance Use Topics    Smoking status: Former Smoker    Smokeless tobacco: Never Used    Alcohol use 0.0 oz/week     0 Standard drinks or equivalent per week      Comment: 2x per year       Current Outpatient Prescriptions   Medication Sig Dispense Refill    diazePAM (VALIUM) 10 mg tablet Take 1 tab PO 45 minutes prior to MRI. Must have . 1 Tab 0    vitamin E (AQUA GEMS) 400 unit capsule Take  by mouth. 4 times a week      cholecalciferol, vitamin D3, (VITAMIN D3) 2,000 unit tab Take  by mouth daily.  selenium 100 mcg tab Take  by mouth daily.  WP THYROID 130 mg tab 130 mg. 1/2 tab bid  1    multivitamin (ONE A DAY) tablet Take 1 Tab by mouth daily.  magnesium oxide (MAG-OX) 400 mg tablet Take 400 mg by mouth daily.  levothyroxine (SYNTHROID) 112 mcg tablet Take  by mouth Daily (before breakfast).  krill-omega-3-dha-epa-lipids 138-76-45-24 mg cap Take  by mouth.       liothyronine (CYTOMEL) 5 mcg tablet Take 5 mcg by mouth two (2) times a day. Plan:  Obtain authorization for testing from insurance company. Report to follow once testing, scoring, and interpretation completed. ? Organic based neurocognitive issues versus mood disorder or combination of same. ? Problems organic, functional, or both? This note will not be viewable in [address] Neuropsychological Evaluation  Patient Testing 4/21/17 Report Completed 4/28/17  A Psychometrist Assisted w/ portions of this evaluation while under my direct supervision    Please refer to the patient's initial interview progress note (copied above) and her medical records for details pertaining to her history. Today's neuropsychological test scores follow: The following assessment procedures were performed:      Neuropsychologist Performed, Interpreted, & Reported: Neuropsychological Mental Status Exam, Revised Memory & Behavior Checklist, Mini Mental State Exam, Clock Drawing Test, Test Of Premorbid Functioning, Basil-Melzack Pain Questionnaire,  History Taking  & Clinical Interview With The Patient,  Review Of Available Records. Psychometrist Administered & Neuropsychologist Interpreted & Neuropsychologist Reported: Finger Tapping Test, Grooved Pegboard Test, Speech-Sounds Perception Test, [employer], Wechsler Adult Intelligence Scale - IV, Verbal Fluency Tests, Samir & Samir - Revised, Trailmaking Test Parts A & B, [place] Verbal Learning Test - II, Inocente Complex Figure Test, Rangel Depression Inventory - II, Rangel Anxiety Inventory, Personality Assessment Inventory. Computer Administered & Neuropsychologist Interpreted & Neuropsychologist Reported: Alyssa Continuous Performance Test - III      Test Findings:  Note:  The patients raw data have been compared with currently available norms which include demographic corrections for age, gender, and/or education.   Sometimes, the patients scores are compared to demographically similar individuals as close to the patients age, education level, etc., as possible. \"Average\" is viewed as being +/- 1 standard deviation (SD) from the stated mean for a particular test score. \"Low average\" is viewed as being between 1 and 2 SD below the mean, and above average is viewed as being 1 and 2 SD above the mean. Scores falling in the borderline range (between 1-1/2 and 2 SD below the mean) are viewed with particular attention as to whether they are normal or abnormal neurocognitive test scores. Other methods of inference in analyzing the test data are also utilized, including the pattern and range of scores in the profile, bilateral motor functions, and the presence, if any, of pathognomonic signs. Behaviorally, the patient was friendly and cooperative and appeared motivated to perform well during this examination. Within this context, the results of this evaluation are viewed as a valid reflection of the patients actual neurocognitive and emotional status. Her structured word list fluency, as assessed by the FAS Test, was within the below average range with a T score of 41. Category fluency was within the average range with a T score of 50. Confrontation naming ability, as assessed by the [facility] - Revised, was within the average range at 56/60 correct (T = 45). This pattern of performance is not indicative of a patient who is at increased risk for day-to-day problems with verbal fluency and confrontation naming ability. The patient was administered the Alyssa Continuous Performance Test - III, a computer-administered test of sustained attention, and review of the subscales within this instrument revealed mild concern for inattentiveness without impulsivity. Verbal auditory attention and discrimination, as assessed by the Speech-Sounds Perception Test (T = 66) was within the above average range.   Nonverbal auditory attention and discrimination, as assessed by the [facility] Rhythm Test (T = 42) was within the below average range. This pattern of performance is  indicative of a patient who is at increased risk for day-to-day problems with sustained visual attention/concentration. Verbal and nonverbal auditory attention and discrimination related abilities were normal.       The patient was administered the Wechsler Adult Intelligence Scale - IV. See Appendix I for full breakdown of IQ test scores (scanned into media section of this EMR). As can be seen, there was no clinically significant difference between her average range Working Memory Index score of 95 (37th %ile) and her average range Processing Speed Index score of 100 (50th  %ile). Her Verbal Comprehension Index score of 91 was within the average range. Her Perceptual Reasoning Index score of 88 was low average. This pattern of performance is not indicative of a patient who is at increased risk for day-to-day problems with working memory and/or processing speed. Perceptual reasoning is slightly lower than expected based on her performance on a task estimating premorbid functioning levels. The patient was administered the [facility] Test  - II and generated an impaired range though positive learning curve over five repeated auditory word list learning trials. An interference trial was impaired. Free and cued, short and long delayed recall were within the impaired range. Recognition recall was technically normal, but a high level of intrusion errors are noted. Forced choice recall was normal, suggesting that she put forth good effort on this test.  This pattern of performance is indicative of a patient who is at increased risk for day-to-day problems with auditory learning and memory. The patients performance on the copy portion of the Inocente Complex Figure Test was impaired.    Recall for the complex design was within the borderline to low average range after both short and long delays. Recognition recall was low average. This pattern of performance is indicative of a patient who is at increased risk for day-to-day problems with visual organization and visual delayed memory. Simple timed visual motor sequencing (Trailmaking Test Part A) was within the average range with a T score of 46. Her performance on a similar, but more complex task of timed visual motor sequencing (Trailmaking Test Part B) was within the average range with a T score of 46. She made zero sequencing errors on this latter test. Taken together, this pattern of performance is not indicative of a patient who is at increased risk for day-to-day problems with executive functioning. Motor speed for finger tapping was within the mildly impaired range for her dominant hand and normal for her nondominant hand. Fine motor dexterity was within the normal range for her dominant hand and mildly impaired for her nondominant hand. This is a very mixed pattern of performance with respect to motor skills. Neurologic correlation is indicated. The patient rated her current level of pain as \"0/5 - No Pain\" on the Basil-Melzack Pain Questionnaire. Her Rangel Depression Inventory -II score of 3 was within the normal range. Her Rangel Anxiety Inventory score of 1 reflected minimal anxiety. The patient was also administered the Personality Assessment Inventory and generated a valid profile for interpretation. Within this context, this is a fairly normal range personality profile without evidence of clinically significant psychopathology. Impressions & Recommendations: This patient generated an abnormal range Neuropsychological Evaluation with respect to neurocognitive functioning. In this regard, there is a trend of decline over time since was last seen at my former practice when I was a postdoc there.   The other neuropsychologist opined that there were some mild cognitive issues primarily pertaining to auditory processing. Now, there is a decline across a number of neurocognitive domains assessed. This includes auditory learning, auditory memory, perceptual reasoning, visual organization, visual memory, and motor skills were mixed. I do not see evidence of a pure auditory processing issue, but mild visual attention problems are present. Emotionally, the patient denied clinically significant psychopathology per se, but is anxious and frustrated about her memory decline. While young for dementia, comparisons between previous and current testing reveal concern for same. This is not an auditory processing disorder, and the memory problems would not be solely attributable to an attention problem, either. I will call this MCI as reversible causes are being ruled out at this time. As such, I recommend consideration for medication for memory and attention if this/these are not medically contraindicated. Her emotional status needs to be monitored over time . I find her competent at this time, but she needs reminders for medications and finances. Continue to utilize GPS when driving. 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 I do not see subtle evidence of any malingering type concern. Baseline now established. Follow up prn. Clinical correlation is, of course, indicated. I will discuss these findings with the patient when she follows up with me in the near future. A follow up Neuropsychological Evaluation is indicated on a prn basis, especially if there are any cognitive and/or emotional changes. DIAGNOSES:  Mild Cognitive Impairment versus Early Dementia     Adjustment w/ Anxiety     The above information is based upon information currently available to me. If there is any additional information of which I am currently unaware, I would be more than happy to review it upon having it made available to me. Thank you for the opportunity to see this interesting individual.     Sincerely,       Michelle Hager. Bisi Woodward, EdS    CC: King Haleigh MD , Dr. Chasity Muro    2 units -[id number]-  Record review. Review of history provided by patient. Review of collaborative information. Testing by Clinician. Review of raw data. Scoring. Report writing of individual tests administered by Clinician. Integration of individual tests administered by psychometrist (that were previously reported and billed under psychometry code below) with testing by clinician and review of records/history/collaborative information. Case Conceptualization, Report writing. Coordination Of Care. 5 units  -[id number] - Psychometrist test prep, administration, and scoring under clinician's direct supervision. Clinical interpretation of individual tests administered by psychometrist .  Clinician report of individual tests administered by psychometrist.    1 unit - 28047 - Computer testing and scoring and clinician's interpretation of computer-administered test(s)    \"Unit\" is defined by CPT/National Guidelines (31 - 60 minutes). Integral services including scoring of raw data, data interpretation, case conceptualization, report writing etcetera were initiated after the patient finished testing/raw data collected and was completed on the date the report was signed.

## 2017-06-27 ENCOUNTER — OFFICE VISIT (OUTPATIENT)
Dept: NEUROLOGY | Age: 57
End: 2017-06-27

## 2017-06-27 VITALS
OXYGEN SATURATION: 99 % | RESPIRATION RATE: 15 BRPM | DIASTOLIC BLOOD PRESSURE: 86 MMHG | BODY MASS INDEX: 27.29 KG/M2 | TEMPERATURE: 98 F | HEART RATE: 77 BPM | WEIGHT: 154 LBS | HEIGHT: 63 IN | SYSTOLIC BLOOD PRESSURE: 128 MMHG

## 2017-06-27 DIAGNOSIS — G47.00 INSOMNIA, UNSPECIFIED TYPE: ICD-10-CM

## 2017-06-27 DIAGNOSIS — G31.84 MCI (MILD COGNITIVE IMPAIRMENT): Primary | ICD-10-CM

## 2017-06-27 NOTE — MR AVS SNAPSHOT
Visit Information Date & Time Provider Department Dept. Phone Encounter #  
 6/27/2017  9:00 AM Jd Whittaker MD [facility] [phone] [id number] Follow-up Instructions Return for after repeat Neuropsych testing 12-18 months from now. Your Appointments 7/11/2017  3:00 PM  
Follow Up with Darion Thakkar PsyD [address] ([address]) Appt Note: office feedback. ..[address] [facility] [address] [phone]  
  
   
 [place] [address] [id number] [address] Upcoming Health Maintenance Date Due Hepatitis C Screening 1960 DTaP/Tdap/Td series (1 - Tdap) 10/6/1981 PAP AKA CERVICAL CYTOLOGY 10/6/1981 FOBT Q 1 YEAR AGE 50-75 10/6/2010 INFLUENZA AGE 9 TO ADULT 8/1/2017 BREAST CANCER SCRN MAMMOGRAM 10/18/2018 Allergies as of 6/27/2017  Review Complete On: 6/27/2017 By: Jd Whittaker MD  
  
 Severity Noted Reaction Type Reactions Latex  01/26/2017    Unknown (comments) Contrast Agent [Iodine]  01/26/2017    Other (comments) Causes high blood pressure and dizziness Hydrochlorothiazide (Bulk)  02/06/2012    Other (comments) Makes bp elevated & alters thyroid levels Codeine Low 02/06/2012    Nausea Only Current Immunizations  Never Reviewed No immunizations on file. Not reviewed this visit You Were Diagnosed With   
  
 Codes Comments MCI (mild cognitive impairment)    -  Primary ICD-10-CM: G31.84 ICD-9-CM: 331.83 Insomnia, unspecified type     ICD-10-CM: G47.00 ICD-9-CM: 780.52 Vitals BP Pulse Temp Resp Height(growth percentile) Weight(growth percentile) 128/86 77 98 °F (36.7 °C) 15 5' 3\" (1.6 m) 154 lb (69.9 kg) SpO2 BMI Smoking Status 99% 27.28 kg/m2 Former Smoker BMI and BSA Data Body Mass Index Body Surface Area 27.28 kg/m 2 1.76 m 2 Preferred Pharmacy Pharmacy Name Phone [facility], [address] AT [facility]  [place] [phone] Your Updated Medication List  
  
   
This list is accurate as of: 6/27/17  9:33 AM.  Always use your most recent med list.  
  
  
  
  
 krill-omega-3-dha-epa-lipids 619-84-68-50 mg Cap Take  by mouth.  
  
 levothyroxine 112 mcg tablet Commonly known as:  SYNTHROID Take  by mouth Daily (before breakfast). liothyronine 5 mcg tablet Commonly known as:  CYTOMEL Take 5 mcg by mouth two (2) times a day. magnesium oxide 400 mg tablet Commonly known as:  MAG-OX Take 400 mg by mouth daily. multivitamin tablet Commonly known as:  ONE A DAY Take 1 Tab by mouth daily. selenium 100 mcg Tab Take  by mouth daily. VITAMIN D3 2,000 unit Tab Generic drug:  cholecalciferol (vitamin D3) Take  by mouth daily. vitamin E 400 unit capsule Commonly known as:  [address] Take  by mouth. 4 times a week WP THYROID 130 mg Tab Generic drug:  Thyroid (Pork) 130 mg. 1/2 tab bid We Performed the Following REFERRAL TO NEUROPSYCHOLOGY [LIC31 Custom] Comments:  
 Please evaluate patient for repeat follow up neuropsych testing due to MCI 1 year from last  
 [address] [facility] Comments:  
 Please evaluate patient for Sleep MD Appointment--chronic insomnia refractory to multiple meds Follow-up Instructions Return for after repeat Neuropsych testing 12-18 months from now. Referral Information Referral ID Referred By Referred To  
  
 [id number] BINH, [address] [facility], [place]   
   [address] [place], [place] [address]Kristi  Phone: [phone] Fax: [phone] Visits Status Start Date End Date 1 New Request 6/27/17 6/27/18 If your referral has a status of pending review or denied, additional information will be sent to support the outcome of this decision. Referral ID Referred By Referred To  
 [id number] JOSEFINA PURCELL Not Available Visits Status Start Date End Date 1 New Request 6/27/17 6/27/18 If your referral has a status of pending review or denied, additional information will be sent to support the outcome of this decision. Patient Instructions Information Regarding Testing If you have physican order for a test or a medication denied by your insurance company, this does not mean the test or medication is not appropriate for you as that is a medical decision, not a decision to be made by an insurance company representative or by an [place]  physician who has not interviewed and examined you. This is a decision to be made between you and your physician. The denial of services is a contractual matter between you and your insurance company, not an issue between your physician and the insurance company. If your test or medication is denied, you can take the following steps to help resolve the issue: 1. File a complaint with the [facility] of Insurance regarding your insurance company's denial of services ordered for you. You can do this either by calling them directly or by completing an on-line complaint form on the [employer]. This can be found at www.[place].gov 2. Also file a formal complaint with your insurance company and ask to have the name of the person denying the service so that you may explore a legal option should you be harmed by this denial of service. Again, the fact the insurance company will not pay for the service does not mean it is not medically necessary and I would encourage you to follow through with the plan that was made with your physician 3.    File a written complaint with your employer so your employer and benefit manager is aware of the poor coverage they are providing their employees. If you have medicare/medicaid, complain to your representative in the House and to your Doroteo Faustin. PRESCRIPTION REFILL POLICY [facility] Statement to Patients April 1, 2014 In an effort to ensure the large volume of patient prescription refills is processed in the most efficient and expeditious manner, we are asking our patients to assist us by calling your Pharmacy for all prescription refills, this will include also your  Mail Order Pharmacy. The pharmacy will contact our office electronically to continue the refill process. Please do not wait until the last minute to call your pharmacy. We need at least 48 hours (2days) to fill prescriptions. We also encourage you to call your pharmacy before going to  your prescription to make sure it is ready. With regard to controlled substance prescription refill requests (narcotic refills) that need to be picked up at our office, we ask your cooperation by providing us with at least 72 hours (3days) notice that you will need a refill. We will not refill narcotic prescription refill requests after 4:00pm on any weekday, Monday through Thursday, or after 2:00pm on Fridays, or on the weekends. We encourage everyone to explore another way of getting your prescription refill request processed using Taqua, our patient web portal through our electronic medical record system. Taqua is an efficient and effective way to communicate your medication request directly to the office and  downloadable as an viktoriya on your smart phone . Taqua also features a review functionality that allows you to view your medication list as well as leave messages for your physician. Are you ready to get connected? If so please review the attatched instructions or speak to any of our staff to get you set up right away! Thank you so much for your cooperation. Should you have any questions please contact our Practice Administrator. The Physicians and Staff,  [facility] If we have ordered testing for you, we do not call patients with results and we do not give test results over the phone. We schedule follow up appointments so that your results can be discussed in person and any questions you have regarding them may be addressed. If something of concern is revealed on your test, we will call you for a sooner follow up appointment. Additionally, results may be found by using the My Chart feature and one of our patient service representatives at the  can give you instructions on how to access this feature of our electronic medical record system. Merlin Adam [address] What is a living will? A living will is a legal form you use to write down the kind of care you want at the end of your life. It is used by the health professionals who will treat you if you aren't able to decide for yourself. If you put your wishes in writing, your loved ones and others will know what kind of care you want. They won't need to guess. This can ease your mind and be helpful to others. A living will is not the same as an estate or property will. An estate will explains what you want to happen with your money and property after you die. Is a living will a legal document? A living will is a legal document. Each state has its own laws about living green. If you move to another state, make sure that your living will is legal in the state where you now live. Or you might use a universal form that has been approved by many states. This kind of form can sometimes be completed and stored online. Your electronic copy will then be available wherever you have a connection to the Internet. In most cases, doctors will respect your wishes even if you have a form from a different state. · You don't need an  to complete a living will. But legal advice can be helpful if your state's laws are unclear, your health history is complicated, or your family can't agree on what should be in your living will. · You can change your living will at any time. Some people find that their wishes about end-of-life care change as their health changes. · In addition to making a living will, think about completing a medical power of  form. This form lets you name the person you want to make end-of-life treatment decisions for you (your \"health care agent\") if you're not able to. Many hospitals and nursing homes will give you the forms you need to complete a living will and a medical power of . · Your living will is used only if you can't make or communicate decisions for yourself anymore. If you become able to make decisions again, you can accept or refuse any treatment, no matter what you wrote in your living will. · Your state may offer an online registry. This is a place where you can store your living will online so the doctors and nurses who need to treat you can find it right away. What should you think about when creating a living will? Talk about your end-of-life wishes with your family members and your doctor. Let them know what you want. That way the people making decisions for you won't be surprised by your choices. Think about these questions as you make your living will: · Do you know enough about life support methods that might be used? If not, talk to your doctor so you know what might be done if you can't breathe on your own, your heart stops, or you're unable to swallow. · What things would you still want to be able to do after you receive life-support methods? Would you want to be able to walk? To speak? To eat on your own? To live without the help of machines? · If you have a choice, where do you want to be cared for? In your home? At a hospital or nursing home? · Do you want certain Congregation practices performed if you become very ill? · If you have a choice at the end of your life, where would you prefer to die? At home? In a hospital or nursing home? Somewhere else? · Would you prefer to be buried or cremated? · Do you want your organs to be donated after you die? What should you do with your living will? · Make sure that your family members and your health care agent have copies of your living will. · Give your doctor a copy of your living will to keep in your medical record. If you have more than one doctor, make sure that each one has a copy. · You may want to put a copy of your living will where it can be easily found. Where can you learn more? Go to http://marko-francoise.info/. Enter L724 in the search box to learn more about \"Learning About Living [place]. \" Current as of: August 8, 2016 Content Version: 11.3 © 6259-9719 [employer]ee Learn. Care instructions adapted under license by [employer] (which disclaims liability or warranty for this information). If you have questions about a medical condition or this instruction, always ask your healthcare professional. [place] [address] any warranty or liability for your use of this information. Introducing [facility]! [employer] introduces LiquidHub patient portal. Now you can access parts of your medical record, email your doctor's office, and request medication refills online. 1. In your internet browser, go to [email or web address] 2. Click on the First Time User? Click Here link in the Sign In box. You will see the New Member Sign Up page. 3. Enter your LiquidHub Access Code exactly as it appears below. You will not need to use this code after youve completed the sign-up process. If you do not sign up before the expiration date, you must request a new code.  
 
· LiquidHub Access Code: [email or web address] 
 Expires: 7/16/2017 12:45 PM 
 
4. Enter the last four digits of your Social Security Number (xxxx) and Date of Birth (mm/dd/yyyy) as indicated and click Submit. You will be taken to the next sign-up page. 5. Create a Axion Health ID. This will be your Axion Health login ID and cannot be changed, so think of one that is secure and easy to remember. 6. Create a Axion Health password. You can change your password at any time. 7. Enter your Password Reset Question and Answer. This can be used at a later time if you forget your password. 8. Enter your e-mail address. You will receive e-mail notification when new information is available in [address] 9. Click Sign Up. You can now view and download portions of your medical record. 10. Click the Download Summary menu link to download a portable copy of your medical information. If you have questions, please visit the Frequently Asked Questions section of the Axion Health website. Remember, [facility] [facility] is NOT to be used for urgent needs. For medical emergencies, dial 911. Now available from your iPhone and Android! Please provide this summary of care documentation to your next provider. Your primary care clinician is listed as Anders Gitelman. If you have any questions after today's visit, please call [phone].

## 2017-06-27 NOTE — PATIENT INSTRUCTIONS
Information Regarding Testing     If you have physican order for a test or a medication denied by your insurance company, this does not mean the test or medication is not appropriate for you as that is a medical decision, not a decision to be made by an insurance company representative or by an [place] physician who has not interviewed and examined you. This is a decision to be made between you and your physician. The denial of services is a contractual matter between you and your insurance company, not an issue between your physician and the insurance company. If your test or medication is denied, you can take the following steps to help resolve the issue:    1. File a complaint with the [facility] regarding your insurance company's denial of services ordered for you. You can do this either by calling them directly or by completing an on-line complaint form on the [employer]. This can be found at www.[employer]    2. Also file a formal complaint with your insurance company and ask to have the name of the person denying the service so that you may explore a legal option should you be harmed by this denial of service. Again, the fact the insurance company will not pay for the service does not mean it is not medically necessary and I would encourage you to follow through with the plan that was made with your physician    3. File a written complaint with your employer so your employer and benefit manager is aware of the poor coverage they are providing their employees. If you have medicare/medicaid, complain to your representative in the House and to your Doroteo Faustin.     [address] [facility]   Statement to Patients  April 1, 2014      In an effort to ensure the large volume of patient prescription refills is processed in the most efficient and expeditious manner, we are asking our patients to assist us by calling your Pharmacy for all prescription refills, this will include also your  Mail Order Pharmacy. The pharmacy will contact our office electronically to continue the refill process. Please do not wait until the last minute to call your pharmacy. We need at least 48 hours (2days) to fill prescriptions. We also encourage you to call your pharmacy before going to  your prescription to make sure it is ready. With regard to controlled substance prescription refill requests (narcotic refills) that need to be picked up at our office, we ask your cooperation by providing us with at least 72 hours (3days) notice that you will need a refill. We will not refill narcotic prescription refill requests after 4:00pm on any weekday, Monday through Thursday, or after 2:00pm on Fridays, or on the weekends. We encourage everyone to explore another way of getting your prescription refill request processed using Databanq, our patient web portal through our electronic medical record system. Databanq is an efficient and effective way to communicate your medication request directly to the office and  downloadable as an viktoriya on your smart phone . Databanq also features a review functionality that allows you to view your medication list as well as leave messages for your physician. Are you ready to get connected? If so please review the attatched instructions or speak to any of our staff to get you set up right away! Thank you so much for your cooperation. Should you have any questions please contact our Practice Administrator. The Physicians and Staff,  [facility]   If we have ordered testing for you, we do not call patients with results and we do not give test results over the phone. We schedule follow up appointments so that your results can be discussed in person and any questions you have regarding them may be addressed.   If something of concern is revealed on your test, we will call you for a sooner follow up appointment. Additionally, results may be found by using the My Chart feature and one of our patient service representatives at the  can give you instructions on how to access this feature of our electronic medical record system. Learning About Quintin Thomas  What is a living will? A living will is a legal form you use to write down the kind of care you want at the end of your life. It is used by the health professionals who will treat you if you aren't able to decide for yourself. If you put your wishes in writing, your loved ones and others will know what kind of care you want. They won't need to guess. This can ease your mind and be helpful to others. A living will is not the same as an estate or property will. An estate will explains what you want to happen with your money and property after you die. Is a living will a legal document? A living will is a legal document. Each state has its own laws about living green. If you move to another state, make sure that your living will is legal in the state where you now live. Or you might use a universal form that has been approved by many states. This kind of form can sometimes be completed and stored online. Your electronic copy will then be available wherever you have a connection to the Internet. In most cases, doctors will respect your wishes even if you have a form from a different state. · You don't need an  to complete a living will. But legal advice can be helpful if your state's laws are unclear, your health history is complicated, or your family can't agree on what should be in your living will. · You can change your living will at any time. Some people find that their wishes about end-of-life care change as their health changes. · In addition to making a living will, think about completing a medical power of  form.  This form lets you name the person you want to make end-of-life treatment decisions for you (your \"health care agent\") if you're not able to. Many hospitals and nursing homes will give you the forms you need to complete a living will and a medical power of . · Your living will is used only if you can't make or communicate decisions for yourself anymore. If you become able to make decisions again, you can accept or refuse any treatment, no matter what you wrote in your living will. · Your state may offer an online registry. This is a place where you can store your living will online so the doctors and nurses who need to treat you can find it right away. What should you think about when creating a living will? Talk about your end-of-life wishes with your family members and your doctor. Let them know what you want. That way the people making decisions for you won't be surprised by your choices. Think about these questions as you make your living will:  · Do you know enough about life support methods that might be used? If not, talk to your doctor so you know what might be done if you can't breathe on your own, your heart stops, or you're unable to swallow. · What things would you still want to be able to do after you receive life-support methods? Would you want to be able to walk? To speak? To eat on your own? To live without the help of machines? · If you have a choice, where do you want to be cared for? In your home? At a hospital or nursing home? · Do you want certain Pentecostal practices performed if you become very ill? · If you have a choice at the end of your life, where would you prefer to die? At home? In a hospital or nursing home? Somewhere else? · Would you prefer to be buried or cremated? · Do you want your organs to be donated after you die? What should you do with your living will? · Make sure that your family members and your health care agent have copies of your living will. · Give your doctor a copy of your living will to keep in your medical record.  If you have more than one doctor, make sure that each one has a copy. · You may want to put a copy of your living will where it can be easily found. Where can you learn more? Go to http://marko-francoise.info/. Enter K043 in the search box to learn more about \"Learning About Living [place]. \"  Current as of: August 8, 2016  Content Version: 11.3  © 2985-0729 [employer]. Care instructions adapted under license by [employer] or [employer] (which disclaims liability or warranty for this information). If you have questions about a medical condition or this instruction, always ask your healthcare professional. [address] any warranty or liability for your use of this information.

## 2017-06-27 NOTE — PROGRESS NOTES
Follow up for results for memory loss. No significant changes in memory since last visit. Reports increase in insomnia.

## 2017-06-27 NOTE — PROGRESS NOTES
[facility] NEUROLOGY [facility]. [address]   [place] [address]   [place] [address]   [phone] [facility]   [phone] Fax               Chief Complaint   Patient presents with    Results     follow up     Past Medical History:   Diagnosis Date    Headache     Hypertension     Hypothyroidism     Insomnia     Melanoma ([address]     Muscle pain      Past Surgical History:   Procedure Laterality Date    HX APPENDECTOMY      HX CHOLECYSTECTOMY      HX GYN      HX HEENT       Current Outpatient Prescriptions   Medication Sig Dispense Refill    vitamin E (AQUA GEMS) 400 unit capsule Take  by mouth. 4 times a week      cholecalciferol, vitamin D3, (VITAMIN D3) 2,000 unit tab Take  by mouth daily.  selenium 100 mcg tab Take  by mouth daily.  multivitamin (ONE A DAY) tablet Take 1 Tab by mouth daily.  magnesium oxide (MAG-OX) 400 mg tablet Take 400 mg by mouth daily.  levothyroxine (SYNTHROID) 112 mcg tablet Take  by mouth Daily (before breakfast).  krill-omega-3-dha-epa-lipids 137-24-23-15 mg cap Take  by mouth.  liothyronine (CYTOMEL) 5 mcg tablet Take 5 mcg by mouth two (2) times a day.  WP THYROID 130 mg tab 130 mg. 1/2 tab bid  1      Allergies   Allergen Reactions    Latex Unknown (comments)    Contrast Agent [Iodine] Other (comments)     Causes high blood pressure and dizziness    Hydrochlorothiazide (Bulk) Other (comments)     Makes bp elevated & alters thyroid levels    Codeine Nausea Only     Social History   Substance Use Topics    Smoking status: Former Smoker    Smokeless tobacco: Never Used    Alcohol use 0.0 oz/week     0 Standard drinks or equivalent per week      Comment: 2x per year     Mrs. Rafi Pierre returns today for follow-up complaints of memory difficulty. She had her neuropsychological evaluation performed. This demonstrated a decline or rather demonstrated that she is in the mild cognitive impairment range.   Previous neuropsychological testing suggested an auditory processing component but Dr. Dede Holden did not feel that was the case. She is not in the dementing range at this point. She is also having chronic insomnia. She has had several sleep studies in the past.  Cannot get to sleep. She is tried Ambien, Lunesta, melatonin, over-the-counter remedies, etc.    Examination  Visit Vitals    /86    Pulse 77    Temp 98 °F (36.7 °C)    Resp 15    Ht 5' 3\" (1.6 m)    Wt 69.9 kg (154 lb)    SpO2 99%    BMI 27.28 kg/m2     She is a pleasant lady. She is appropriately dressed and groomed. Anicteric sclera. Normal speech and language. Impression/Plan  We discussed her test results. We discussed mild cognitive impairment. We discussed how we follow this. We discussed the difference between mild cognitive impairment and dementia. We discussed that 50% of people with mild cognitive impairment will go on to have an Alzheimer's type dementia and 50% will not. We discussed how we do not know which ones will and will not and that we typically repeat neuropsychological evaluation every year to 18 months looking for decline so we can institute treatment as soon as possible if we see a decline which would indicate transition into a dementing type process. We discussed the amyloid scan but at the present time that is not covered by insurance. She will contact her insurance company to see if they would cover that. That would give us a definitive diagnosis in terms of amyloid accumulation and certainly if she did have amyloid deposition suggestive of Alzheimer's and we would start to treat her accordingly. She had questions today regarding ginkgo biloba as well as other supplements we discussed that. Discussed having a good heart healthy diet exercising at least 3-4 times weekly doing good cardio and having good cardiovascular health and good brain health overall.   At this point we will arrange for her to see  Rai Rogers in a year and have repeat testing. I will have her see our sleep medicine physicians for an opinion regarding her chronic insomnia. I will see her back after her repeat neuropsychological testing has been performed again. Total time: 30 min   Counseling / coordination time: 25 min   > 50% counseling / coordination?: Yes re: as documented above        This note was created using voice recognition software. Despite editing, there may be syntax errors. This note will not be viewable in [address]

## 2017-07-11 ENCOUNTER — OFFICE VISIT (OUTPATIENT)
Dept: SLEEP MEDICINE | Age: 57
End: 2017-07-11

## 2017-07-11 VITALS
DIASTOLIC BLOOD PRESSURE: 77 MMHG | TEMPERATURE: 98 F | WEIGHT: 157 LBS | HEART RATE: 82 BPM | SYSTOLIC BLOOD PRESSURE: 146 MMHG | OXYGEN SATURATION: 97 % | HEIGHT: 63 IN | BODY MASS INDEX: 27.82 KG/M2

## 2017-07-11 DIAGNOSIS — G47.00 INSOMNIA, UNSPECIFIED TYPE: Primary | ICD-10-CM

## 2017-07-11 DIAGNOSIS — G31.84 MILD COGNITIVE IMPAIRMENT: ICD-10-CM

## 2017-07-11 RX ORDER — UREA 10 %
LOTION (ML) TOPICAL
COMMUNITY
End: 2019-03-18

## 2017-07-11 NOTE — PATIENT INSTRUCTIONS
[address] [facility] [place]. [place], [address]  Tel.  [phone]  Fax. [address] [facility] [address]  [place], [address]  Tel.  [phone]  Fax. [phone] [address]  Tel.  [phone]  Fax. [phone]       Insomnia: After Your Visit  Your Care Instructions  Insomnia is the inability to sleep well. It is a common problem for most people at some time. Insomnia may make it hard for you to get to sleep, stay asleep, or sleep as long as you need to. This can make you tired and grouchy during the day. It can also make you forgetful, less effective at work, and unhappy. Insomnia can be caused by conditions such as depression or anxiety. Pain can also affect your ability to sleep. When these problems are solved, the insomnia usually clears up. But sometimes bad sleep habits can cause insomnia. If insomnia is affecting your work or your enjoyment of life, you can take steps to improve your sleep. Follow-up care is a key part of your treatment and safety. Be sure to make and go to all appointments, and call your doctor if you are having problems. It's also a good idea to know your test results and keep a list of the medicines you take. How can you care for yourself at home? What to avoid  · Do not have drinks with caffeine, such as coffee or black tea, for 8 hours before bed. · Do not smoke or use other types of tobacco near bedtime. Nicotine is a stimulant and can keep you awake. · Avoid drinking alcohol late in the evening, because it can cause you to wake in the middle of the night. · Do not eat a big meal close to bedtime. If you are hungry, eat a light snack. · Do not drink a lot of water close to bedtime, because the need to urinate may wake you up during the night. · Do not read or watch TV in bed. Use the bed only for sleeping and sexual activity.   What to try  · Go to bed at the same time every night, and wake up at the same time every morning. Do not take naps during the day. · Keep your bedroom quiet, dark, and cool. · Get regular exercise, but not within 3 to 4 hours of your bedtime. .  · Sleep on a comfortable pillow and mattress. · If watching the clock makes you anxious, turn it facing away from you so you cannot see the time. · If you worry when you lie down, start a worry book. Well before bedtime, write down your worries, and then set the book and your concerns aside. · Try meditation or other relaxation techniques before you go to bed. · If you cannot fall asleep, get up and go to another room until you feel sleepy. Do something relaxing. Repeat your bedtime routine before you go to bed again. · Make your house quiet and calm about an hour before bedtime. Turn down the lights, turn off the TV, log off the computer, and turn down the volume on music. This can help you relax after a busy day. When should you call for help? Watch closely for changes in your health, and be sure to contact your doctor if:  · Your efforts to improve your sleep do not work. · Your insomnia gets worse. · You fall asleep during the day. Where can you learn more? Go to [employer]  Enter P513 in the search box to learn more about \"Insomnia: After Your Visit. \"   © 9233-9571 [employer]wise, [employer]. Care instructions adapted under license by [facility] (which disclaims liability or warranty for this information). This care instruction is for use with your licensed healthcare professional. If you have questions about a medical condition or this instruction, always ask your healthcare professional. [place] [address] any warranty or liability for your use of this information. Content Version: 1.9.95659; Last Revised: June 26, 2010    Drowsy Driving:  The [facility] cites drowsiness as a causing factor in more than 090,488 police reported crashes annually, resulting in 76,000 injuries and 1,500 deaths. Other surveys suggest 55% of people polled have driven while drowsy in the past year, 23% had fallen asleep but not crashed, 3% crashed, and 2% had and accident due to drowsy driving. Who is at risk? Young Drivers: One study of drowsy driving accidents states that 55% of the drivers were under 25 years. Of those, 75% were male. Shift Workers and Travelers: People who work overnight or travel across time zones frequently are at higher risk of experiencing Circadian Rhythm Disorders. They are trying to work and function when their body is programed to sleep. Sleep Deprived: Lack of sleep has a serious impact on your ability to pay attention or focus on a task. Consistently getting less than the average of 8 hours your body needs creates partial or cumulative sleep deprivation. Untreated Sleep Disorders: Sleep Apnea, Narcolepsy, R.L.S., and other sleep disorders (untreated) prevent a person from getting enough restful sleep. This leads to excessive daytime sleepiness and increases the risk for drowsy driving accidents by up to 7 times. Medications / Alcohol: Even over the counter medications can cause drowsiness. Medications that impair a drivers attention should have a warning label. Alcohol naturally makes you sleepy and on its own can cause accidents. Combined with excessive drowsiness its effects are amplified. Signs of Drowsy Driving:   * You don't remember driving the last few miles   * You may drift out of your alma   * You are unable to focus and your thoughts wander   * You may yawn more often than normal   * You have difficulty keeping your eyes open / nodding off   * Missing traffic signs, speeding, or tailgating  Prevention-   Good sleep hygiene, lifestyle and behavioral choices have the most impact on drowsy driving. There is no substitute for sleep and the average person requires 8 hours nightly.  If you find yourself driving drowsy, stop and sleep. Consider the sleep hygiene tips provided during your visit as well. Medication Refill Policy: Refills for all medications require 1 week advance notice. Please have your pharmacy fax a refill request. We are unable to fax, or call in \"controled substance\" medications and you will need to pick these prescriptions up from our office. [employer] Activation    Thank you for requesting access to [employer]. Please follow the instructions below to securely access and download your online medical record. [employer] allows you to send messages to your doctor, view your test results, renew your prescriptions, schedule appointments, and more. How Do I Sign Up? 1. In your internet browser, go to [email or web address]. 2. Click on the First Time User? Click Here link in the Sign In box. You will see the New Member Sign Up page. 3. Enter your [employer] Access Code exactly as it appears below. You will not need to use this code after youve completed the sign-up process. If you do not sign up before the expiration date, you must request a new code. [employer] Access Code: [email or web address]  Expires: 2017 12:45 PM (This is the date your [employer] access code will )    4. Enter the last four digits of your Social Security Number (xxxx) and Date of Birth (mm/dd/yyyy) as indicated and click Submit. You will be taken to the next sign-up page. 5. Create a [employer] ID. This will be your [employer] login ID and cannot be changed, so think of one that is secure and easy to remember. 6. Create a [employer] password. You can change your password at any time. 7. Enter your Password Reset Question and Answer. This can be used at a later time if you forget your password. 8. Enter your e-mail address. You will receive e-mail notification when new information is available in [address] 9. Click Sign Up. You can now view and download portions of your medical record.   10. Click the Download Summary menu link to download a portable copy of your medical information. Additional Information    If you have questions, please call [phone]. Remember, [facility] is NOT to be used for urgent needs. For medical emergencies, dial 911.

## 2017-07-11 NOTE — Clinical Note
Thank you for the referral.  I will keep you informed of her progress.  [address], [place]

## 2017-10-21 ENCOUNTER — HOSPITAL ENCOUNTER (OUTPATIENT)
Dept: MAMMOGRAPHY | Age: 57
Discharge: HOME | End: 2017-10-21
Attending: OBSTETRICS & GYNECOLOGY
Payer: COMMERCIAL

## 2017-10-21 DIAGNOSIS — Z12.31 VISIT FOR SCREENING MAMMOGRAM: ICD-10-CM

## 2017-10-21 PROCEDURE — 77067 SCR MAMMO BI INCL CAD: CPT

## 2018-01-08 ENCOUNTER — TELEPHONE (OUTPATIENT)
Dept: NEUROLOGY | Age: 58
End: 2018-01-08

## 2018-01-08 NOTE — TELEPHONE ENCOUNTER
----- Message from Sagar Cross sent at 1/8/2018  9:34 AM EST -----  Regarding: Dr Katie Solano  Pt p) [phone], pt needs a return call to schedule a f//u visit with Dr Fei Beach.

## 2018-07-05 ENCOUNTER — OFFICE VISIT (OUTPATIENT)
Dept: NEUROLOGY | Age: 58
End: 2018-07-05

## 2018-07-05 DIAGNOSIS — F43.22 ADJUSTMENT DISORDER WITH ANXIETY: ICD-10-CM

## 2018-07-05 DIAGNOSIS — G47.00 INSOMNIA, UNSPECIFIED TYPE: ICD-10-CM

## 2018-07-05 DIAGNOSIS — G31.84 MCI (MILD COGNITIVE IMPAIRMENT): Primary | ICD-10-CM

## 2018-07-05 DIAGNOSIS — H93.25 AUDITORY PROCESSING DISORDER: ICD-10-CM

## 2018-07-05 DIAGNOSIS — R41.3 MEMORY LOSS: ICD-10-CM

## 2018-07-05 DIAGNOSIS — R47.89 WORD FINDING DIFFICULTY: ICD-10-CM

## 2018-07-05 NOTE — PROGRESS NOTES
[facility],[address]  [address]   [place] [address] [place] [address] [address]   [phone] Office   [phone] Fax      Neuropsychology    Exam #[id number]    Initial Diagnostic Interview Note      Referral:  Dhruv Dawn MD,  Tang Padilla is a 62 y.o. right handed    female who was accompanied to the initial clinical interview on 7/5/18. Please refer to her medical records for details pertaining to her history. Briefly, the patient reported    51-year-old right-handed woman who presents today for evaluation of memory loss. She says that her symptoms began about 10 years ago when she noticed decline in her ability to function cognitively. She says that her  believes over the years she is about the same but she perceives that she is worse. Back in 2008 she had a neuropsych evaluation which did show a mixed normal and abnormal result demonstrating some auditory processing issues. She has not had that repeated. She has not had her B12 checked. She says that she repeats the same question until the same story. She gives example today that she prepared for this appointment by putting in the address and her GPS device and she says she was on her way here when she realized she did not know where she was going and she did not have her GPS on. She then had to figure out how to turn on the GPS and get things working so that she could get here. She says that she goes to the grocery store and does not have it written down she will not buy it. 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 She has trouble with computer applications. She has trouble with higher level math. She works as a  and she works with  first and third grade students.   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 She says her family is frustrated and getting increasingly frustrated. She denies any focal weakness. No loss or alteration in consciousness. She did have her thyroid medication adjusted recently as she was having some palpitations. Her levels were too high and the medication was decreased. Her thyroid was resected in 1995 secondary to a question of thyroid cancer but the pathology came back normal.  She had a question of obstructive sleep apnea had a equivocal polysomnogram per her report and she lost 80 pounds and had subsequent testing performed and she does not have obstructive sleep apnea. She does have long-standing insomnia and she has tried multiple medications and seen at least to sleep doctors but nothing has helped her. When I saw her last:  Ricci Anguiano is a 64 y.o. right handed   female who was unaccompanied to the initial clinical interview on 4/17/17 . Please refer to her medical records for details pertaining to her history. Briefly, the patient reported that she completed a Bachelor's Degree and additional Master's level coursework as well. No history of previously diagnosed LD and/or receipt of special education services. She is a . She has been having memory issues progressively worsening since about 2006. Her family members don't really notice. Spouse wrote a letter which I also reviewed. She is trying to figure if there is anything she is doing or be on. She had testing done in 2008 and I was a post doc at that time and participated in her evaluation. She had auditory memory/auditory processing issues. She forgets the content of conversations. Misplaces things. Starts tasks and does not complete. Getting here today - had a GPS on and still got lost coming here, and it is the second time getting here. She asks the same question over and over. She says that she goes to the grocery store and does not have it written down she will not buy it. 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 Her family is frustrated and are increasingly so. She is independent for medications, finances, day-to-day chores, ADLs, etc.  No balance issues. No changes in sense of taste or smell. She denies any focal weakness. No loss or alteration in consciousness. She did have her thyroid medication adjusted recently as she was having some palpitations. Her levels were too high and the medication was decreased. Her thyroid was resected in 1995 secondary to a question of thyroid cancer but the pathology came back normal. She had a question of obstructive sleep apnea had a equivocal polysomnogram per her report and she lost 80 pounds and had subsequent testing performed and she does not have obstructive sleep apnea. She does have long-standing insomnia and she has tried multiple medications and seen at least to sleep doctors but nothing has helped her. Still continues to sleep poorly despite various meds and sleep studies.  Postin, she is doing mostly okay, stressed/anxious about her memory and gets down about it at times. No major psych history though. No counseling or psychiatrist.  Appetite is fine.       Spouse notes that she has a hard time using a DVD player and has difficulty remembering things he says. Family history of likely dementia. Conversations with spouse are frustrating because she doesn't remember things he tells her. This has been going on for quite some time.         My diagnostic impressions then included: This patient generated an abnormal range Neuropsychological Evaluation with respect to neurocognitive functioning. In this regard, there is a trend of decline over time since was last seen at my former practice when I was a postdoc there.   The other neuropsychologist opined that there were some mild cognitive issues primarily pertaining to auditory processing. Now, there is a decline across a number of neurocognitive domains assessed. This includes auditory learning, auditory memory, perceptual reasoning, visual organization, visual memory, and motor skills were mixed. I do not see evidence of a pure auditory processing issue, but mild visual attention problems are present. Emotionally, the patient denied clinically significant psychopathology per se, but is anxious and frustrated about her memory decline.                             While young for dementia, comparisons between previous and current testing reveal concern for same. This is not an auditory processing disorder, and the memory problems would not be solely attributable to an attention problem, either. I will call this MCI as reversible causes are being ruled out at this time. As such, I recommend consideration for medication for memory and attention if this/these are not medically contraindicated. Her emotional status needs to be monitored over time . I find her competent at this time, but she needs reminders for medications and finances. Continue to utilize GPS when driving. 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 I do not see subtle evidence of any malingering type concern. Baseline now established. Follow up prn. Clinical correlation is, of course, indicated.                             I will discuss these findings with the patient when she follows up with me in the near future.   A follow up Neuropsychological Evaluation is indicated on a prn basis, especially if there are any cognitive and/or emotional changes.       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 She struggles with multistep directions. Sometimes she can remember things, and sometimes she cannot. She has trouble with names. She can remember dates without issues. Also saw her in  and a mild decline was noted over time from  to her last evaluation in 2017 and now there are ongoing cognitive issues. Continues to struggle with math concepts. 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 She remains independent for medications, finances, day-to-day chores, and ADLs. She has no new stroke, meningitis/encephalitis, NEIL Fever, Lupus, Lyme,. TBI, sz, etc.  The last thyroid medication she was on was contributing to migraines, so medications switched and migraines are definitely better now. Balance is fine. No falls. Sleep is poor. She has had a sleep issue since college. She has been tried on numerous sleep medications and has been to two sleep clinics and those records also in chart. The only thing that was working was [employer] [place] but came off of that because it stopped working. Sleep is a chronic issue. Appetite has been good. No changes in sense of taste or smell. Home life stable. Her family notices the memory decline. Her father just passed away and she is going to his  today and going up to [facility].  Has been going through a lot with that. No counseling or psychiatrist.  Father was in hospice. She is off for the summer work wise. No meds for attention or memory at this point. No meds for mood currently.      Neuropsychological Mental Status Exam (NMSE):  Historian: Good  Praxis: No UE apraxia  R/L Orientation: Intact to self and to other  Dress: within normal limits   Weight: within normal limits   Appearance/Hygiene: within normal limits   Gait: within normal limits   Assistive Devices: Glasses  Mood: within normal limits   Affect: within normal limits   Comprehension: within normal limits   Thought Process: within normal limits   Expressive Language: within normal limits   Receptive Language: within normal limits   Motor:  No cognitive or motor perseveration  ETOH: Once in awhile, will have a glass of wine, once every two weeks  Tobacco: Denied  Illicit: Denied  SI/HI: Denied  Psychosis: Denied  Insight: Within normal limits  Judgment: Within normal limits  Other Psych:      Past Medical History:   Diagnosis Date    Headache     Hypertension     Hypothyroidism     Insomnia     Melanoma ([address]     Muscle pain        Past Surgical History:   Procedure Laterality Date    HX APPENDECTOMY      HX CHOLECYSTECTOMY      HX GYN      HX HEENT         Allergies   Allergen Reactions    Latex Unknown (comments)    Contrast Agent [Iodine] Other (comments)     Causes high blood pressure and dizziness    Hydrochlorothiazide (Bulk) Other (comments)     Makes bp elevated & alters thyroid levels    Codeine Nausea Only       Family History   Problem Relation Age of Onset    Dementia Mother     Coronary Artery Disease Father     Heart Attack Father     Cancer Father     Heart Disease Father     Stroke Father     Breast Cancer Maternal Aunt     Parkinson's Disease Maternal Aunt     Mental Retardation Sister     Heart Disease Maternal Grandmother     Stroke Maternal Grandmother     Heart Disease Maternal Grandfather     Heart Disease Paternal Grandmother     Stroke Paternal Grandmother     Heart Disease Paternal Grandfather     Headache Other        Social History   Substance Use Topics    Smoking status: Former Smoker    Smokeless tobacco: Never Used      Comment: quit in 1990-only smoked for 4 years in college    Alcohol use 0.0 oz/week     0 Standard drinks or equivalent per week      Comment: 2x per year       Current Outpatient Prescriptions   Medication Sig Dispense Refill    melatonin 1 mg tablet Take  by mouth.  ASCORBATE CALCIUM (VITAMIN C PO) Take  by mouth.  ZINC PO Take  by mouth.       vitamin E (AQUA GEMS) 400 unit capsule Take  by mouth. 4 times a week      cholecalciferol, vitamin D3, (VITAMIN D3) 2,000 unit tab Take  by mouth daily.  selenium 100 mcg tab Take  by mouth daily.  WP THYROID 130 mg tab 130 mg. 1/2 tab bid  1    multivitamin (ONE A DAY) tablet Take 1 Tab by mouth daily.  magnesium oxide (MAG-OX) 400 mg tablet Take 400 mg by mouth daily.  levothyroxine (SYNTHROID) 112 mcg tablet Take  by mouth Daily (before breakfast).  krill-omega-3-dha-epa-lipids 231-40-09-93 mg cap Take  by mouth.  liothyronine (CYTOMEL) 5 mcg tablet Take 5 mcg by mouth two (2) times a day. Plan:  Obtain authorization for testing from insurance company. Report to follow once testing, scoring, and interpretation completed. ? Organic based neurocognitive issues versus mood disorder or combination of same. ? Problems organic, functional, or both? This note will not be viewable in [address] 62year old with cognitive and especially memory decline of unknown etiology. Decline in cognition noted over two previous neuropsychs with ongoing issues now reported though mood is better and migraines better control. Still struggles with sleep issues which are chronic. On meds for thyroid and check up there in two weeks. Will update testing and compare from previous and history is suspicious for a dementing type process. Will evaluate.

## 2018-07-17 ENCOUNTER — OFFICE VISIT (OUTPATIENT)
Dept: NEUROLOGY | Age: 58
End: 2018-07-17

## 2018-07-17 DIAGNOSIS — F43.23 ADJUSTMENT DISORDER WITH MIXED ANXIETY AND DEPRESSED MOOD: ICD-10-CM

## 2018-07-17 DIAGNOSIS — G31.84 MCI (MILD COGNITIVE IMPAIRMENT): Primary | ICD-10-CM

## 2018-07-19 NOTE — PROGRESS NOTES
[address] [facility],5Th Floor  [address]   [place] [address] [place] [place] [address] [address]   [phone] Office   [phone] Fax      Neuropsychological Evaluation Report    Exam #[id number]  Referral:  Zackery Tapia MD, Dr. Portia Costa is a 62 y.o. right handed    female who was unaccompanied to the initial clinical interview on 7/5/18. Please refer to her medical records for details pertaining to her history. Briefly, the patient reported    68-year-old right-handed woman who presents today for evaluation of memory loss. She says that her symptoms began about 10 years ago when she noticed decline in her ability to function cognitively. She says that her  believes over the years she is about the same but she perceives that she is worse. Back in 2008 she had a neuropsych evaluation which did show a mixed normal and abnormal result demonstrating some auditory processing issues. She has not had that repeated. She has not had her B12 checked. She says that she repeats the same question until the same story. She gives example today that she prepared for this appointment by putting in the address and her GPS device and she says she was on her way here when she realized she did not know where she was going and she did not have her GPS on. She then had to figure out how to turn on the GPS and get things working so that she could get here. She says that she goes to the grocery store and does not have it written down she will not buy it. 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 She has trouble with computer applications. She has trouble with higher level math. She works as a  and she works with  first and third grade students.   She says there is a  position open but she is not able to fulfill the duties secondary to her cognitive issues. She says her family is frustrated and getting increasingly frustrated. She denies any focal weakness. No loss or alteration in consciousness. She did have her thyroid medication adjusted recently as she was having some palpitations. Her levels were too high and the medication was decreased. Her thyroid was resected in 1995 secondary to a question of thyroid cancer but the pathology came back normal.  She had a question of obstructive sleep apnea had a equivocal polysomnogram per her report and she lost 80 pounds and had subsequent testing performed and she does not have obstructive sleep apnea. She does have long-standing insomnia and she has tried multiple medications and seen at least to sleep doctors but nothing has helped her. When I saw her last:  Anil De Souza is a 64 y.o. right handed   female who was unaccompanied to the initial clinical interview on 4/17/17 . Please refer to her medical records for details pertaining to her history. Briefly, the patient reported that she completed a Bachelor's Degree and additional Master's level coursework as well. No history of previously diagnosed LD and/or receipt of special education services. She is a . She has been having memory issues progressively worsening since about 2006. Her family members don't really notice. Spouse wrote a letter which I also reviewed. She is trying to figure if there is anything she is doing or be on. She had testing done in 2008 and I was a post doc at that time and participated in her evaluation. She had auditory memory/auditory processing issues. She forgets the content of conversations. Misplaces things. Starts tasks and does not complete. Getting here today - had a GPS on and still got lost coming here, and it is the second time getting here. She asks the same question over and over.   She says that she goes to the grocery store and does not have it written down she will not buy it. 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 Her family is frustrated and are increasingly so. She is independent for medications, finances, day-to-day chores, ADLs, etc.  No balance issues. No changes in sense of taste or smell. She denies any focal weakness. No loss or alteration in consciousness. She did have her thyroid medication adjusted recently as she was having some palpitations. Her levels were too high and the medication was decreased. Her thyroid was resected in 1995 secondary to a question of thyroid cancer but the pathology came back normal. She had a question of obstructive sleep apnea had a equivocal polysomnogram per her report and she lost 80 pounds and had subsequent testing performed and she does not have obstructive sleep apnea. She does have long-standing insomnia and she has tried multiple medications and seen at least to sleep doctors but nothing has helped her. Still continues to sleep poorly despite various meds and sleep studies. Magda Hopkins, she is doing mostly okay, stressed/anxious about her memory and gets down about it at times. No major psych history though. No counseling or psychiatrist.  Appetite is fine.       Spouse notes that she has a hard time using a DVD player and has difficulty remembering things he says. Family history of likely dementia. Conversations with spouse are frustrating because she doesn't remember things he tells her. This has been going on for quite some time.         My diagnostic impressions then included: This patient generated an abnormal range Neuropsychological Evaluation with respect to neurocognitive functioning. In this regard, there is a trend of decline over time since was last seen at my former practice when I was a postdoc there.   The other neuropsychologist opined that there were some mild cognitive issues primarily pertaining to auditory processing. Now, there is a decline across a number of neurocognitive domains assessed. This includes auditory learning, auditory memory, perceptual reasoning, visual organization, visual memory, and motor skills were mixed. I do not see evidence of a pure auditory processing issue, but mild visual attention problems are present. Emotionally, the patient denied clinically significant psychopathology per se, but is anxious and frustrated about her memory decline.                             While young for dementia, comparisons between previous and current testing reveal concern for same. This is not an auditory processing disorder, and the memory problems would not be solely attributable to an attention problem, either. I will call this MCI as reversible causes are being ruled out at this time. As such, I recommend consideration for medication for memory and attention if this/these are not medically contraindicated. Her emotional status needs to be monitored over time . I find her competent at this time, but she needs reminders for medications and finances. Continue to utilize GPS when driving. 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 I do not see subtle evidence of any malingering type concern. Baseline now established. Follow up prn. Clinical correlation is, of course, indicated.                             I will discuss these findings with the patient when she follows up with me in the near future. A follow up Neuropsychological Evaluation is indicated on a prn basis, especially if there are any cognitive and/or emotional changes.       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 She struggles with multistep directions. Sometimes she can remember things, and sometimes she cannot. She has trouble with names. She can remember dates without issues. Also saw her in 2008 and a mild decline was noted over time from  to her last evaluation in 2017 and now there are ongoing cognitive issues. Continues to struggle with math concepts. 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 She remains independent for medications, finances, day-to-day chores, and ADLs. She has no new stroke, meningitis/encephalitis, NEIL Fever, Lupus, Lyme,. TBI, sz, etc.  The last thyroid medication she was on was contributing to migraines, so medications switched and migraines are definitely better now. Balance is fine. No falls. Sleep is poor. She has had a sleep issue since college. She has been tried on numerous sleep medications and has been to two sleep clinics and those records also in chart. The only thing that was working was [employer] but came off of that because it stopped working. Sleep is a chronic issue. Appetite has been good. No changes in sense of taste or smell. Home life stable. Her family notices the memory decline. Her father just passed away and she is going to his  today and going up to [facility].  Has been going through a lot with that. No counseling or psychiatrist.  Father was in hospice. She is off for the summer work wise. No meds for attention or memory at this point. No meds for mood currently.      Neuropsychological Mental Status Exam (NMSE):  Historian: Good  Praxis: No UE apraxia  R/L Orientation: Intact to self and to other  Dress: within normal limits   Weight: within normal limits   Appearance/Hygiene: within normal limits   Gait: within normal limits   Assistive Devices: Glasses  Mood: within normal limits   Affect: within normal limits   Comprehension: within normal limits   Thought Process: within normal limits   Expressive Language: within normal limits   Receptive Language: within normal limits   Motor:  No cognitive or motor perseveration  ETOH: Once in awhile, will have a glass of wine, once every two weeks  Tobacco: Denied  Illicit: Denied  SI/HI: Denied  Psychosis: Denied  Insight: Within normal limits  Judgment: Within normal limits  Other Psych:      Past Medical History:   Diagnosis Date    Headache     Hypertension     Hypothyroidism     Insomnia     Melanoma ([address]     Muscle pain        Past Surgical History:   Procedure Laterality Date    HX APPENDECTOMY      HX CHOLECYSTECTOMY      HX GYN      HX HEENT         Allergies   Allergen Reactions    Latex Unknown (comments)    Contrast Agent [Iodine] Other (comments)     Causes high blood pressure and dizziness    Hydrochlorothiazide (Bulk) Other (comments)     Makes bp elevated & alters thyroid levels    Codeine Nausea Only       Family History   Problem Relation Age of Onset    Dementia Mother     Coronary Artery Disease Father     Heart Attack Father     Cancer Father     Heart Disease Father     Stroke Father     Breast Cancer Maternal Aunt     Parkinson's Disease Maternal Aunt     Mental Retardation Sister     Heart Disease Maternal Grandmother     Stroke Maternal Grandmother     Heart Disease Maternal Grandfather     Heart Disease Paternal Grandmother     Stroke Paternal Grandmother     Heart Disease Paternal Grandfather     Headache Other        Social History   Substance Use Topics    Smoking status: Former Smoker    Smokeless tobacco: Never Used      Comment: quit in 1990-only smoked for 4 years in college    Alcohol use 0.0 oz/week     0 Standard drinks or equivalent per week      Comment: 2x per year       Current Outpatient Prescriptions   Medication Sig Dispense Refill    melatonin 1 mg tablet Take  by mouth.  ASCORBATE CALCIUM (VITAMIN C PO) Take  by mouth.  ZINC PO Take  by mouth.       vitamin E (AQUA GEMS) 400 unit capsule Take  by mouth. 4 times a week      cholecalciferol, vitamin D3, (VITAMIN D3) 2,000 unit tab Take  by mouth daily.  selenium 100 mcg tab Take  by mouth daily.  WP THYROID 130 mg tab 130 mg. 1/2 tab bid  1    multivitamin (ONE A DAY) tablet Take 1 Tab by mouth daily.  magnesium oxide (MAG-OX) 400 mg tablet Take 400 mg by mouth daily.  levothyroxine (SYNTHROID) 112 mcg tablet Take  by mouth Daily (before breakfast).  krill-omega-3-dha-epa-lipids [id number] mg cap Take  by mouth.  liothyronine (CYTOMEL) 5 mcg tablet Take 5 mcg by mouth two (2) times a day. Plan:  Obtain authorization for testing from insurance company. Report to follow once testing, scoring, and interpretation completed. ? Organic based neurocognitive issues versus mood disorder or combination of same. ? Problems organic, functional, or both? This note will not be viewable in [address] 62year old with cognitive and especially memory decline of unknown etiology. Decline in cognition noted over two previous neuropsychs with ongoing issues now reported though mood is better and migraines better control. Still struggles with sleep issues which are chronic. On meds for thyroid and check up there in two weeks. Will update testing and compare from previous and history is suspicious for a dementing type process. Will evaluate. Neuropsychological Evaluation  Patient Testing 7/17/18 Report Completed 7/19/18  A Psychometrist Assisted w/ portions of this evaluation while under my direct supervision    Please refer to the patient's initial interview progress note (copied above) and her medical records for details pertaining to her history. Today's neuropsychological test scores follow:     The following assessment procedures were performed:      Neuropsychologist Performed, Interpreted, & Reported: Neuropsychological Mental Status Exam, Revised Memory & Behavior Checklist, Mini Mental State Exam, Clock Drawing Test, Test Of Premorbid Functioning, Basil-Melzack Pain Questionnaire,  History Taking  & Clinical Interview With The Patient, Review Of Available Records. Psychometrist Administered & Neuropsychologist Interpreted & Neuropsychologist Reported: Finger Tapping Test, Grooved Pegboard Test, Speech-Sounds Perception Test, [employer], Wechsler Adult Intelligence Scale - IV, Verbal Fluency Tests, [facility] - Revised, Trailmaking Test Parts A & B, [place] Verbal Learning Test - 3, Inocente Complex Figure Test, Rangel Depression Inventory - II, Rangel Anxiety Inventory, Personality Assessment Inventory. Computer Administered & Neuropsychologist Interpreted & Neuropsychologist Reported: Alyssa Continuous Performance Test - III      Test Findings:  Note:  The patients raw data have been compared with currently available norms which include demographic corrections for age, gender, and/or education. Sometimes, the patients scores are compared to demographically similar individuals as close to the patients age, education level, etc., as possible. \"Average\" is viewed as being +/- 1 standard deviation (SD) from the stated mean for a particular test score. \"Low average\" is viewed as being between 1 and 2 SD below the mean, and above average is viewed as being 1 and 2 SD above the mean. Scores falling in the borderline range (between 1-1/2 and 2 SD below the mean) are viewed with particular attention as to whether they are normal or abnormal neurocognitive test scores. Other methods of inference in analyzing the test data are also utilized, including the pattern and range of scores in the profile, bilateral motor functions, and the presence, if any, of pathognomonic signs. Behaviorally, the patient was friendly and cooperative and appeared motivated to perform well during this examination.   Within this context, the results of this evaluation are viewed as a valid reflection of the patients actual neurocognitive and emotional status. Her structured word list fluency, as assessed by the FAS Test, was within the average range with a T score of 45. Category fluency was within the mildly to moderately impaired range with a T score of 33. Confrontation naming ability, as assessed by the [facility] - Revised, was within the average range at 57/60 correct (T = 49). This pattern of performance is indicative of a patient who is at increased risk for day-to-day problems with verbal fluency and confrontation naming ability was normal.       The patient was administered the Alyssa Continuous Performance Test - III, a computer-administered test of sustained attention, and review of the subscales within this instrument revealed mild concern for inattentiveness without impulsivity. Verbal auditory attention and discrimination, as assessed by the Speech-Sounds Perception Test (T = 47) was within the average range. Nonverbal auditory attention and discrimination, as assessed by the [facility] Rhythm Test (T = 45) was within the average range. This pattern of performance is  indicative of a patient who is at increased risk for day-to-day problems with sustained visual attention/concentration. Verbal and nonverbal auditory attention and discrimination related abilities were normal.       The patient was administered the Wechsler Adult Intelligence Scale - IV. See Appendix I for full breakdown of IQ test scores (scanned into media section of this EMR). As can be seen, there was a clinically significant difference between her low average range Working Memory Index score of 89 (23rd %ile) and her average range Processing Speed Index score of 105 (63rd %ile). Her Verbal Comprehension Index score of 95 (37th %ile) was within the average range. Her Perceptual Reasoning Index score of 104 was average.   This pattern of performance is not indicative of a patient who is at increased risk for day-to-day problems with working memory and/or processing speed. Scores generally commensurate with what would be expected based on her performance on a task estimating premorbid functioning levels. The patient was administered the [place] Verbal Learning Test  - 3 and generated a normal range and positive learning curve over five repeated auditory word list learning trials. An interference trial was within normal limits. Free and cued, short and long delayed recall were within or above the normal range. Recognition and forced choice recall were within normal limits. This pattern of performance is not indicative of a patient who is at increased risk for day-to-day problems with auditory learning and/or memory. The patients performance on the copy portion of the Inocente Complex Figure Test was within normal limits  (>16th %ile). Recall for the complex design was within the normal range after both short and long delays. Recognition recall was normal.  This pattern of performance is not indicative of a patient who is at increased risk for day-to-day problems with visual organization and visual delayed memory. Simple timed visual motor sequencing (Trailmaking Test Part A) was within the average range with a T score of 46. Her performance on a similar, but more complex task of timed visual motor sequencing (Trailmaking Test Part B) was within the mildly to moderately impaired range with a T score of 33. She made only two  sequencing errors on this latter completed test.   Taken together, this pattern of performance is not indicative of a patient who is at increased risk for day-to-day problems with executive functioning. Motor speed for finger tapping was within the normal range bilaterally. Fine motor dexterity, as assessed by the [facility], was within the low normal  range bilaterally.   This pattern of performance is not indicative of a patient who is at increased risk for day-to-day problems with bilateral motor speed and dexterity. The patient rated her current level of pain as \"0/5 - No Pain\" on the Basil-Melzack Pain Questionnaire. Tyson Farmer Her Rangel Depression Inventory -II score of 1 was normal.  Her Rangel Anxiety Inventory score of 0 reflected minimal anxiety. The patient was also administered the Personality Assessment Inventory and generated a valid profile for interpretation, though some defensiveness in responding is noted. Within this context, there are physical signs of depression and a need for affiliation/attention from others. The personality profile is otherwise normal.      Impressions & Recommendations: This patient again generated a mixed normal/abnormal range Neuropsychological Evaluation with respect to neurocognitive functioning. In this regard, she is showing mild problems with attention and mild impairment on one of two tests of verbal fluency. Otherwise, her performance across all other neurocognitive domains assessed, including mental status, confrontation naming, auditory learning and memory, visual organization, visual memory, working memory, processing speed, perceptual reasoning, verbal comprehension, executive functioning, and bilateral motor skills were within normal limits. From an emotional standpoint, the patient was somewhat defensive in her response style on personality testing and there are physical signs of depression present. A mild improvement in memory is noted over time, and there is no concern for dementia here. Instead, this remains mild/selected cognitive deficits compounded by at least mild depressive issues and situational stressors are present as well. The exam is reassuring. Consider treatment for attention problems if not medically contraindicated along with treatment to include counseling for mood.   Exam is quite reassuring, and hopefully treating attention and mood will improve day-to-day cognition. We do not need yearly testing unless there are any cognitive or psychiatric changes. Clinical correlation is advised. I will discuss these findings with the patient when she follows up with me in the near future. A follow up Neuropsychological Evaluation is indicated on a prn basis, especially if there are any cognitive and/or emotional changes. DIAGNOSES:  Mild/Stable Cognitive Deficits     Adjustment Disorder w/ Mixed Emotional Features/Somatic Elements           The above information is based upon information currently available to me. If there is any additional information of which I am currently unaware, I would be more than happy to review it upon having it made available to me. Thank you for the opportunity to see this interesting individual.     Sincerely,       Luly Coombs. Heather Arzola PsyD, EdS    CC: Jesusita Rayo MD , Dr. Matthew Doan    2 units -90413-  1.5 hours. Record review. Review of history provided by patient. Review of collaborative information. Testing by Clinician. Review of raw data. Scoring. Report writing of individual tests administered by Clinician. Integration of individual tests administered by psychometrist (that were previously reported and billed under psychometry code below) with testing by clinician and review of records/history/collaborative information. Case Conceptualization, Report writing. Coordination Of Care. 5 units  -57859 - 4.5 hours. Psychometrist test prep, administration, and scoring under clinician's direct supervision. Clinical interpretation of individual tests administered by psychometrist .  Clinician report of individual tests administered by psychometrist.    1 unit - 70135 - 40 minutes. Computer testing and scoring and clinician's interpretation of computer-administered test(s)    \"Unit\" is defined by CPT/National Guidelines (31 - 60 minutes).   Integral services including scoring of raw data, data interpretation, case conceptualization, report writing etcetera were initiated after the patient finished testing/raw data collected and was completed on the date the report was signed.

## 2018-09-04 ENCOUNTER — OFFICE VISIT (OUTPATIENT)
Dept: NEUROLOGY | Age: 58
End: 2018-09-04

## 2018-09-04 DIAGNOSIS — G31.84 MCI (MILD COGNITIVE IMPAIRMENT): Primary | ICD-10-CM

## 2018-09-04 DIAGNOSIS — F43.23 ADJUSTMENT DISORDER WITH MIXED ANXIETY AND DEPRESSED MOOD: ICD-10-CM

## 2018-09-04 NOTE — PROGRESS NOTES
Office feedback session with the patient today. I reviewed the results of the recent Neuropsychological Evaluation, including discussing individual tests as well as patient's areas of neurocognitive strength versus weakness. Education was provided regarding my diagnostic impressions, and we discussed treatment plan/options. I also answered numerous questions related to the clinical findings, including discussing various methods to improve cognition and mood. Counseling provided regarding mood and cognition. No concern for dementia, but she has aattention issues and depression. To seek treatment for same and this is overall exam is otherwise normal and this is reassuring. CBT and supportive psychotherapy techniques were utilized. The patient will follow up with the referring provider, and reported being very pleased with the services provided. Follow up with [facility] prn. 20 minutes with patient, record review, coordination of care. Records provided. We discussed: This patient again generated a mixed normal/abnormal range Neuropsychological Evaluation with respect to neurocognitive functioning. In this regard, she is showing mild problems with attention and mild impairment on one of two tests of verbal fluency. Otherwise, her performance across all other neurocognitive domains assessed, including mental status, confrontation naming, auditory learning and memory, visual organization, visual memory, working memory, processing speed, perceptual reasoning, verbal comprehension, executive functioning, and bilateral motor skills were within normal limits. From an emotional standpoint, the patient was somewhat defensive in her response style on personality testing and there are physical signs of depression present. A mild improvement in memory is noted over time, and there is no concern for dementia here.  Instead, this remains mild/selected cognitive deficits compounded by at least mild depressive issues and situational stressors are present as well. The exam is reassuring. Consider treatment for attention problems if not medically contraindicated along with treatment to include counseling for mood. Exam is quite reassuring, and hopefully treating attention and mood will improve day-to-day cognition. We do not need yearly testing unless there are any cognitive or psychiatric changes. Clinical correlation is advised.                             I will discuss these findings with the patient when she follows up with me in the near future.   A follow up Neuropsychological Evaluation is indicated on a prn basis, especially if there are any cognitive and/or emotional changes.       DIAGNOSES:                                 Mild/Stable Cognitive Deficits                                                                    Adjustment Disorder w/ Mixed Emotional Features/Somatic Elements

## 2018-10-30 ENCOUNTER — HOSPITAL ENCOUNTER (OUTPATIENT)
Dept: MAMMOGRAPHY | Age: 58
Discharge: HOME | End: 2018-10-30
Payer: COMMERCIAL

## 2018-10-30 ENCOUNTER — HOSPITAL ENCOUNTER (OUTPATIENT)
Dept: MAMMOGRAPHY | Age: 58
Discharge: HOME | End: 2018-10-30

## 2018-10-30 DIAGNOSIS — Z12.31 ENCOUNTER FOR SCREENING MAMMOGRAM FOR BREAST CANCER: ICD-10-CM

## 2018-10-30 PROCEDURE — 77067 SCR MAMMO BI INCL CAD: CPT

## 2019-03-18 ENCOUNTER — OFFICE VISIT (OUTPATIENT)
Dept: CARDIOLOGY CLINIC | Age: 59
End: 2019-03-18

## 2019-03-18 VITALS
BODY MASS INDEX: 28.88 KG/M2 | HEIGHT: 63 IN | HEART RATE: 92 BPM | DIASTOLIC BLOOD PRESSURE: 74 MMHG | SYSTOLIC BLOOD PRESSURE: 122 MMHG | WEIGHT: 163 LBS | RESPIRATION RATE: 16 BRPM

## 2019-03-18 DIAGNOSIS — R06.02 SOB (SHORTNESS OF BREATH): ICD-10-CM

## 2019-03-18 DIAGNOSIS — R00.2 PALPITATIONS: Primary | ICD-10-CM

## 2019-03-18 RX ORDER — CYCLOBENZAPRINE HCL 10 MG
TABLET ORAL
Refills: 1 | COMMUNITY
Start: 2019-01-22

## 2019-03-18 NOTE — PROGRESS NOTES
Chief Complaint   Patient presents with    New Patient     LOV 2/2016    Chest Pain (Angina)     Visit Vitals  /74 (BP 1 Location: Left arm, BP Patient Position: Sitting)   Pulse 92   Resp 16   Ht 5' 3\" (1.6 m)   Wt 163 lb (73.9 kg)   LMP  (LMP Unknown)   BMI 28.87 kg/m²

## 2019-03-18 NOTE — PROGRESS NOTES
Young Merino MD. [facility]              Patient: Damaris Blount  : 1960      Today's Date: 3/18/2019            HISTORY OF PRESENT ILLNESS:     History of Present Illness:  Here for chest pain and palpitations. Self referred. I saw her in 2016. She saw Glenwood Fraction after me and he did a Holter which apparently showed 2 brief runs of afib. Lately she has had some heart racing and LIMON on stairs sometimes. She had thyroid medication and feels back to normal - has some palpitations - infrequent. No CP since thyroid medication adjusted. PAST MEDICAL HISTORY:     Past Medical History:   Diagnosis Date    Headache     Hypertension     Hypothyroidism     Insomnia     Melanoma ([address]     Mild cognitive impairment     Muscle pain          Past Surgical History:   Procedure Laterality Date    HX APPENDECTOMY      HX CHOLECYSTECTOMY      HX GYN      HX HEENT             MEDICATIONS:     Current Outpatient Medications   Medication Sig Dispense Refill    thyroid,pork (NP THYROID PO) Take  by mouth. 1 1/2 grain at 5am, 1 grain at 10 am and 1 grain at 3pm      vitamin B complex (B COMPLEX 1 PO) Take  by mouth. 3 times weekly      cyclobenzaprine (FLEXERIL) 10 mg tablet TK 1 T PO QHS  1    vitamin E (AQUA GEMS) 400 unit capsule Take  by mouth. 4 times a week      cholecalciferol, vitamin D3, (VITAMIN D3) 2,000 unit tab Take  by mouth daily.  selenium 100 mcg tab Take  by mouth daily.  multivitamin (ONE A DAY) tablet Take 1 Tab by mouth daily.  magnesium oxide (MAG-OX) 400 mg tablet Take 400 mg by mouth daily.  krill-omega-3-dha-epa-lipids 339-06-36-35 mg cap Take  by mouth daily.          Allergies   Allergen Reactions    Latex Rash    Ciprofloxacin Other (comments)     Interacts with thyroid medication    Contrast Agent [Iodine] Other (comments)     Causes high blood pressure and dizziness    Hydrochlorothiazide (Bulk) Other (comments)     Makes bp elevated & alters thyroid levels    Nitrofurantoin Monohyd/M-Cryst Nausea Only    Codeine Nausea Only             SOCIAL HISTORY:     Social History     Tobacco Use    Smoking status: Former Smoker    Smokeless tobacco: Never Used    Tobacco comment: quit in 1990-only smoked for 4 years in college   Substance Use Topics    Alcohol use: Yes     Alcohol/week: 0.0 oz     Comment: 2x per year    Drug use: No         FAMILY HISTORY:     Family History   Problem Relation Age of Onset    Dementia Mother     Coronary Artery Disease Father     Heart Attack Father     Cancer Father     Heart Disease Father     Stroke Father     Parkinson's Disease Maternal Aunt     Mental Retardation Sister     Heart Disease Maternal Grandmother     Stroke Maternal Grandmother     Heart Disease Maternal Grandfather     Heart Disease Paternal Grandmother     Stroke Paternal Grandmother     Heart Disease Paternal Grandfather     Headache Other     Breast Cancer Paternal Aunt             REVIEW OF SYMPTOMS:      Review of Symptoms:  Constitutional: Negative for fever, chills  HEENT: Negative for nosebleeds, tinnitus, and vision changes. Respiratory: Negative for cough, wheezing  Cardiovascular: Negative for orthopnea, claudication, syncope, and PND. Gastrointestinal: Negative for abdominal pain, diarrhea, melena. Genitourinary: Negative for dysuria  Musculoskeletal: Negative for myalgias. Skin: Negative for rash  Heme: No problems bleeding. Neurological: Negative for speech change and focal weakness.                   PHYSICAL EXAM:     Physical Exam:  Visit Vitals  /74 (BP 1 Location: Left arm, BP Patient Position: Sitting)   Pulse 92   Resp 16   Ht 5' 3\" (1.6 m)   Wt 163 lb (73.9 kg)   LMP  (LMP Unknown)   BMI 28.87 kg/m²     Patient appears generally well, mood and affect are appropriate and pleasant. HEENT:  Hearing intact, non-icteric, normocephalic, atraumatic. Neck Exam: Supple, No JVD or carotid bruits. Lung Exam: Clear to auscultation, even breath sounds. Cardiac Exam: Regular rate and rhythm with no murmur  Abdomen: Soft, non-tender, normal bowel sounds. No bruits or masses. Extremities: Moves all ext well. No lower extremity edema. Vascular: 2+ dorsalis pedis pulses bilaterally. Psych: Appropriate affect  Neuro - Grossly intact          LABS / OTHER STUDIES:      Labs 5/15 - chol 191, TG 74, HDL 72,   Labs 12/22/15 - CBC OK, TSH 0.04  Labs 9/18 -CBC and CMP OK, chol 200, TG 62, , HDL 71  Labs 12/18 - CMP OK,   Labs 2/17 - TSH 0.007, T3 226,            CARDIAC DIAGNOSTICS:      Cardiac Evaluation Includes:  Stress echo 2/6/12 - patient walked 5 min; normal stress EKG and echo.  Resting echo images showed mild mitral regurgitation. Holter 2/8/12 - NSR with HR ; one hour of frequent PAC's, one 6 beat run of ectopic atrial tach, 2 PVC's, Diary entries (CP, fast HR) correlate with NSR rate . Stress Echo 2/15/16 - walked 2:13 (4.6 METS). No CP. Normal stress EKG and stress echo. Extra images show normal heart anatomy and normal PA pressures.      CT Heart Scan 2/18/16 - CAC Score 0      Carotid Doppler 1/16/17 - 1-15% stenosis bilat     Echo 8/17 () - LVEF 60%    Holter 8/17 - NSR, rare PVC's, 2 brief runs of afib (per report)        EKG 2/6/12 - NSR, normal  EKG 2/8/16 - NSR, normal   EKG 3/18/19 - NSR, normal             ASSESSMENT AND PLAN:      Assessment and Plan:  1) SOB, palpitations   - She recently had problems with SOB, heart racing which have much improved since her thyroid medications were adjusted   - Can consider further testing if she has recurrent symptoms (she agrees with this approach)     2) Brief afib per Holter report   - Dr. Vernon Reinoso noted some transient afib on 8/17 Holter  - Her [id number] score is 1  - She has brief palpitations infrequently  - At this time with lack of significant symptoms and low [id number] score, management would not change much even if she had brief Afib  - Can consider a 30 day event monitor if symptoms worsen     3) CV risk assessment   - CAC Score was 0 in 2016 indicating low CV risk     4) See me back as needed. Patient expressed understanding of the plan - questions were answered.            Young Merino MD, [address] [place] [address], [address] [place] [address]  Ph: [phone]                                [phone]

## 2019-08-10 ENCOUNTER — APPOINTMENT (OUTPATIENT)
Dept: GENERAL RADIOLOGY | Age: 59
End: 2019-08-10
Attending: EMERGENCY MEDICINE
Payer: COMMERCIAL

## 2019-08-10 ENCOUNTER — HOSPITAL ENCOUNTER (EMERGENCY)
Age: 59
Discharge: HOME | End: 2019-08-10
Attending: EMERGENCY MEDICINE
Payer: COMMERCIAL

## 2019-08-10 VITALS
TEMPERATURE: 99.2 F | HEIGHT: 63 IN | RESPIRATION RATE: 12 BRPM | BODY MASS INDEX: 28.7 KG/M2 | OXYGEN SATURATION: 99 % | WEIGHT: 162 LBS | DIASTOLIC BLOOD PRESSURE: 76 MMHG | HEART RATE: 107 BPM | SYSTOLIC BLOOD PRESSURE: 147 MMHG

## 2019-08-10 DIAGNOSIS — I10 ESSENTIAL HYPERTENSION: Primary | ICD-10-CM

## 2019-08-10 DIAGNOSIS — R00.0 SINUS TACHYCARDIA: ICD-10-CM

## 2019-08-10 LAB
ALBUMIN SERPL-MCNC: 4.2 G/DL (ref 3.5–5)
ALBUMIN/GLOB SERPL: 1.1 {RATIO} (ref 1.1–2.2)
ALP SERPL-CCNC: 75 U/L (ref 45–117)
ALT SERPL-CCNC: 19 U/L (ref 12–78)
ANION GAP SERPL CALC-SCNC: 6 MMOL/L (ref 5–15)
AST SERPL-CCNC: 19 U/L (ref 15–37)
BASOPHILS # BLD: 0.1 K/UL (ref 0–0.1)
BASOPHILS NFR BLD: 1 % (ref 0–1)
BILIRUB SERPL-MCNC: 0.4 MG/DL (ref 0.2–1)
BUN SERPL-MCNC: 13 MG/DL (ref 6–20)
BUN/CREAT SERPL: 21 (ref 12–20)
CALCIUM SERPL-MCNC: 9.6 MG/DL (ref 8.5–10.1)
CHLORIDE SERPL-SCNC: 105 MMOL/L (ref 97–108)
CO2 SERPL-SCNC: 27 MMOL/L (ref 21–32)
COMMENT, HOLDF: NORMAL
CREAT SERPL-MCNC: 0.63 MG/DL (ref 0.55–1.02)
D DIMER PPP FEU-MCNC: <0.19 MG/L FEU (ref 0–0.65)
DIFFERENTIAL METHOD BLD: ABNORMAL
EOSINOPHIL # BLD: 0.1 K/UL (ref 0–0.4)
EOSINOPHIL NFR BLD: 1 % (ref 0–7)
ERYTHROCYTE [DISTWIDTH] IN BLOOD BY AUTOMATED COUNT: 13.6 % (ref 11.5–14.5)
GLOBULIN SER CALC-MCNC: 3.8 G/DL (ref 2–4)
GLUCOSE SERPL-MCNC: 176 MG/DL (ref 65–100)
HCT VFR BLD AUTO: 44.4 % (ref 35–47)
HGB BLD-MCNC: 14.3 G/DL (ref 11.5–16)
IMM GRANULOCYTES # BLD AUTO: 0.1 K/UL (ref 0–0.04)
IMM GRANULOCYTES NFR BLD AUTO: 1 % (ref 0–0.5)
LYMPHOCYTES # BLD: 1.6 K/UL (ref 0.8–3.5)
LYMPHOCYTES NFR BLD: 21 % (ref 12–49)
MCH RBC QN AUTO: 26.2 PG (ref 26–34)
MCHC RBC AUTO-ENTMCNC: 32.2 G/DL (ref 30–36.5)
MCV RBC AUTO: 81.3 FL (ref 80–99)
MONOCYTES # BLD: 0.4 K/UL (ref 0–1)
MONOCYTES NFR BLD: 5 % (ref 5–13)
NEUTS SEG # BLD: 5.5 K/UL (ref 1.8–8)
NEUTS SEG NFR BLD: 71 % (ref 32–75)
NRBC # BLD: 0 K/UL (ref 0–0.01)
NRBC BLD-RTO: 0 PER 100 WBC
PLATELET # BLD AUTO: 264 K/UL (ref 150–400)
PMV BLD AUTO: 9.1 FL (ref 8.9–12.9)
POTASSIUM SERPL-SCNC: 3.7 MMOL/L (ref 3.5–5.1)
PROT SERPL-MCNC: 8 G/DL (ref 6.4–8.2)
RBC # BLD AUTO: 5.46 M/UL (ref 3.8–5.2)
SAMPLES BEING HELD,HOLD: NORMAL
SODIUM SERPL-SCNC: 138 MMOL/L (ref 136–145)
TROPONIN I SERPL-MCNC: <0.05 NG/ML
TSH SERPL DL<=0.05 MIU/L-ACNC: 0.01 UIU/ML (ref 0.36–3.74)
WBC # BLD AUTO: 7.7 K/UL (ref 3.6–11)

## 2019-08-10 PROCEDURE — 71046 X-RAY EXAM CHEST 2 VIEWS: CPT

## 2019-08-10 PROCEDURE — 84484 ASSAY OF TROPONIN QUANT: CPT

## 2019-08-10 PROCEDURE — 80053 COMPREHEN METABOLIC PANEL: CPT

## 2019-08-10 PROCEDURE — 85025 COMPLETE CBC W/AUTO DIFF WBC: CPT

## 2019-08-10 PROCEDURE — 93005 ELECTROCARDIOGRAM TRACING: CPT

## 2019-08-10 PROCEDURE — 74011250636 HC RX REV CODE- 250/636: Performed by: EMERGENCY MEDICINE

## 2019-08-10 PROCEDURE — 36415 COLL VENOUS BLD VENIPUNCTURE: CPT

## 2019-08-10 PROCEDURE — 99283 EMERGENCY DEPT VISIT LOW MDM: CPT

## 2019-08-10 PROCEDURE — 96360 HYDRATION IV INFUSION INIT: CPT

## 2019-08-10 PROCEDURE — 85379 FIBRIN DEGRADATION QUANT: CPT

## 2019-08-10 PROCEDURE — 84443 ASSAY THYROID STIM HORMONE: CPT

## 2019-08-10 RX ORDER — METOPROLOL TARTRATE 25 MG/1
12.5 TABLET, FILM COATED ORAL 2 TIMES DAILY
Qty: 30 TAB | Refills: 0 | Status: SHIPPED | OUTPATIENT
Start: 2019-08-10 | End: 2019-09-09

## 2019-08-10 RX ADMIN — SODIUM CHLORIDE 1000 ML: 900 INJECTION, SOLUTION INTRAVENOUS at 06:55

## 2019-08-10 NOTE — ED TRIAGE NOTES
Patient arrives with complaints of \" I have high blood pressure and a high heart beat\" Patient has been having HTN for about a month and when being seen by provider yesterday, was placed on spironolactone. Patient states that after being started on the medication last night, that she has a stiff neck, cant stop peeing, and heart rate has continued to be high.

## 2019-08-10 NOTE — ED PROVIDER NOTES
62 y.o. female with past medical history significant for hypothyroidism, melanoma, GERD and HTN who presents from private vehicle with chief complaint of palpitations;hypertension. Pt reports palpitations, accompanied by headache since yesterday. Pt states her heart is beating fast. Pt currently rates her headache a 4/10. Pt reports taking tylenol for headache. She states that she regularly gets headaches like this but last night her headache was worse than her typical. Pt states she was unable to sleep last night. Pt notes hypertension for 1 month and states she was evaluated at [facility] for her symptoms yesterday and advised to present to the ED if her sx persisted. Pt reports she was recently placed on spironolactone by her PCP and her sx seemed to get worse after starting this medication. Pt also c/o mild chest pain for 1 month. Pt states she is unsure if the chest pain is related to GERD d/t previous history. Pt describes the chest pain sharp and burning as pressure. Pt rates her chest pain a \"5/10\". Pt notes intermittent nausea with the chest pain. Pt reports mild SOB while going up the stairs and attributes it to her fast heart rate. She states that she has seen cardiology previously, but states that \"they never found anything wrong with me. \" Pt notes her thyroid medication was recently decreased and she is following closely with her PCP monitoring this, which she states caused her blood pressure to increase. Pt notes surgical history of thyroidectomy. Pt denies history of blood clots or CAD or arrhythmia. Pt denies taking aspirin. Pt denies vision changes, numbness, or weakness, N, vomiting, abd pain. There are no other acute medical concerns at this time. PCP: Alfredo Medrano DO    Significant FMHx: Pt reports her father had a hx of MI's and stroke. Note written by Michael Ramires, as dictated by Rodney Saunders DO 7:10 AM              The history is provided by the patient.  No language  was used. Past Medical History:   Diagnosis Date    Headache     Hypertension     Hypothyroidism     Insomnia     Melanoma ([address]     Mild cognitive impairment     Muscle pain        Past Surgical History:   Procedure Laterality Date    HX APPENDECTOMY      HX CHOLECYSTECTOMY      HX GYN      HX HEENT           Family History:   Problem Relation Age of Onset    Dementia Mother     Coronary Artery Disease Father     Heart Attack Father     Cancer Father     Heart Disease Father     Stroke Father     Parkinson's Disease Maternal Aunt     Mental Retardation Sister     Heart Disease Maternal Grandmother     Stroke Maternal Grandmother     Heart Disease Maternal Grandfather     Heart Disease Paternal Grandmother     Stroke Paternal Grandmother     Heart Disease Paternal Grandfather     Headache Other     Breast Cancer Paternal Aunt        Social History     Socioeconomic History    Marital status:      Spouse name: Not on file    Number of children: Not on file    Years of education: Not on file    Highest education level: Not on file   Occupational History    Not on file   Social Needs    Financial resource strain: Not on file    Food insecurity:     Worry: Not on file     Inability: Not on file    Transportation needs:     Medical: Not on file     Non-medical: Not on file   Tobacco Use    Smoking status: Former Smoker    Smokeless tobacco: Never Used    Tobacco comment: quit in 1990-only smoked for 4 years in college   Substance and Sexual Activity    Alcohol use:  Yes     Alcohol/week: 0.0 standard drinks     Comment: 2x per year    Drug use: No    Sexual activity: Not on file   Lifestyle    Physical activity:     Days per week: Not on file     Minutes per session: Not on file    Stress: Not on file   Relationships    Social connections:     Talks on phone: Not on file     Gets together: Not on file     Attends Taoism service: Not on file Active member of club or organization: Not on file     Attends meetings of clubs or organizations: Not on file     Relationship status: Not on file    Intimate partner violence:     Fear of current or ex partner: Not on file     Emotionally abused: Not on file     Physically abused: Not on file     Forced sexual activity: Not on file   Other Topics Concern    Not on file   Social History Narrative    Not on file         ALLERGIES: Latex; Ciprofloxacin; Contrast agent [iodine]; Hydrochlorothiazide (bulk); Nitrofurantoin monohyd/m-cryst; and Codeine    Review of Systems   Constitutional: Negative for activity change, appetite change, chills and fever. HENT: Negative for congestion, rhinorrhea, sinus pressure, sneezing and sore throat. Eyes: Negative for photophobia and visual disturbance. Respiratory: Positive for shortness of breath. Negative for cough. Cardiovascular: Positive for chest pain and palpitations. Gastrointestinal: Positive for nausea. Negative for abdominal pain, blood in stool, constipation, diarrhea and vomiting. Genitourinary: Negative for difficulty urinating, dysuria, flank pain, frequency, hematuria, menstrual problem, urgency, vaginal bleeding and vaginal discharge. Musculoskeletal: Negative for arthralgias, back pain, myalgias and neck pain. Skin: Negative for rash and wound. Neurological: Positive for headaches. Negative for syncope, weakness and numbness. Psychiatric/Behavioral: Negative for self-injury and suicidal ideas. All other systems reviewed and are negative. Vitals:    08/10/19 0649 08/10/19 0745   BP: 172/77 147/76   Pulse: (!) 125 (!) 107   Resp: 19 12   Temp: 99.2 °F (37.3 °C)    SpO2: 100% 99%   Weight: 73.5 kg (162 lb)    Height: 5' 3\" (1.6 m)             Physical Exam   Constitutional: She is oriented to person, place, and time. She appears well-developed and well-nourished. No distress. Pleasant. No acute distress.    HENT:   Head: Normocephalic and atraumatic. Relatively dry mucous membranes. Eyes: Pupils are equal, round, and reactive to light. Conjunctivae and EOM are normal.   Neck: Neck supple. Cardiovascular: Regular rhythm and normal heart sounds. Tachycardia present. Heart regular, mildly tachycardic. Pulmonary/Chest: Effort normal and breath sounds normal. She exhibits tenderness (Mild over the right border of her sternum without bony crepitus or deformity. No evidence of trauma. ). Clear lung sounds. Abdominal: Soft. She exhibits no distension. There is no tenderness. Abdomen soft, non tender. Musculoskeletal: She exhibits no edema or tenderness. No lower extremity swelling or tenderness. Neurological: She is alert and oriented to person, place, and time. She has normal strength. No cranial nerve deficit or sensory deficit. Coordination normal.   Cranial nerves intact. Fluent speech. Intact finger to nose. Negative pronator drift. 5/5 strength of all four extremites. Intact sensation. Skin: Skin is warm and dry. She is not diaphoretic. Nursing note and vitals reviewed. Note written by Michael Troncoso, as dictated by Mortimer Pancake, DO 7:10 AM       MDM   80-year-old female with a history of HTN presents with palpitations and 1 month of mild right-sided chest tenderness, some shortness of breath and intermittent headaches. Exam as above, neuro intact. Labs returned showing no leukocytosis or anemia, normal electrolytes, renal function, LFTs. TSH low, associated with thyroid prior removal being monitored by her PCP. Trop neg, dimer neg    CXR viewed by myself and read by radiology showing no acute abnormalities. Given IV fluids and tachycardia resolved and she was noted to have pulse rate in the 80's while at rest.  Blood pressure ranged in the 489B to 169W systolic range. Feel that her tachycardia may have been associated with overdiuresis with the spironolactone leading to some dehydration.  That in combination with her thyroid medication regimen, stress, anxiety. Will rx low dose metoprolol to modulate both sinus tachycardia and HTN, as the patient states that she no longer wants to take spironolactone. She states that she is planning to follow up with her PCP next week. She was rec'd to do so and return precautions given for worsening or concerns. Procedures    ED EKG interpretation:  Rhythm: sinus tachycardia; and regular . Rate (approx.): 126 BPM; ST/T wave: No acute ST/T wave abnormalities suggestive of ischemia;    Note written by Michael Vazquez, as dictated by Josh Bueno DO 6:49 AM

## 2019-08-11 LAB
ATRIAL RATE: 126 BPM
CALCULATED P AXIS, ECG09: 67 DEGREES
CALCULATED R AXIS, ECG10: 36 DEGREES
CALCULATED T AXIS, ECG11: 33 DEGREES
DIAGNOSIS, 93000: NORMAL
P-R INTERVAL, ECG05: 152 MS
Q-T INTERVAL, ECG07: 292 MS
QRS DURATION, ECG06: 64 MS
QTC CALCULATION (BEZET), ECG08: 422 MS
VENTRICULAR RATE, ECG03: 126 BPM

## 2019-09-28 ENCOUNTER — HOSPITAL ENCOUNTER (OUTPATIENT)
Dept: MAMMOGRAPHY | Age: 59
Discharge: HOME | End: 2019-09-28
Attending: OBSTETRICS & GYNECOLOGY

## 2019-09-28 DIAGNOSIS — Z12.31 VISIT FOR SCREENING MAMMOGRAM: ICD-10-CM

## 2019-10-12 ENCOUNTER — HOSPITAL ENCOUNTER (OUTPATIENT)
Dept: MAMMOGRAPHY | Age: 59
Discharge: HOME | End: 2019-10-12
Attending: OBSTETRICS & GYNECOLOGY
Payer: COMMERCIAL

## 2019-10-12 PROCEDURE — 77067 SCR MAMMO BI INCL CAD: CPT

## 2020-06-05 ENCOUNTER — OFFICE VISIT (OUTPATIENT)
Dept: PRIMARY CARE CLINIC | Age: 60
End: 2020-06-05

## 2020-06-05 DIAGNOSIS — Z01.818 PRE-OP EVALUATION: Primary | ICD-10-CM

## 2020-06-05 RX ORDER — LIOTHYRONINE SODIUM 5 UG/1
5 TABLET ORAL
COMMUNITY

## 2020-06-05 RX ORDER — LEVOTHYROXINE SODIUM 88 UG/1
88 TABLET ORAL
COMMUNITY

## 2020-06-05 RX ORDER — FUROSEMIDE 20 MG/1
20 TABLET ORAL AS NEEDED
COMMUNITY

## 2020-06-05 RX ORDER — NEBIVOLOL 2.5 MG/1
2.5 TABLET ORAL DAILY
COMMUNITY
End: 2021-03-17

## 2020-06-05 NOTE — PROGRESS NOTES
[address]  Endoscopy Preprocedure Instructions      1. On the day of your surgery, please report to registration located on the 2nd floor of the  [facility]. yes    2. You must have a responsible adult to drive you to the hospital, stay at the hospital during your procedure and drive you home. If they leave your procedure will not be started (no exceptions). yes    3. Do not have anything to eat or drink (including water, gum, mints, coffee, and juice) after midnight. This does not apply to the medications you were instructed to take by your physician. yesIf you are currently taking Plavix, Coumadin, Aspirin, or other blood-thinning agents, contact your physician for special instructions. not applicable,    4. If you are having a procedure that requires bowel prep: We recommend that you have only clear liquids the day before your procedure and begin your bowel prep by 5:00 pm.  You may continue to drink clear liquids until midnight. If for any reason you are not able to complete your prep please notify your physician immediately. yes    5. Have a list of all current medications, including vitamins, herbal supplements and any other over the counter medications. yes    6. If you wear glasses, contacts, dentures and/or hearing aids, they may be removed prior to procedure, please bring a case to store them in. yes    7. You should understand that if you do not follow these instructions your procedure may be cancelled. If your physical condition changes (I.e. fever, cold or flu) please contact your doctor as soon as possible. 8. It is important that you be on time.   If for any reason you are unable to keep your appointment please call [phone] the day of or your physicians office prior to your scheduled procedure

## 2020-06-05 NOTE — PROGRESS NOTES
Pt requesting change in prep times, and does not know what medications she can take the day of her procedure. Pt instructed to call Dr. Bhumi Garcia office with her questions.

## 2020-06-07 LAB — SARS-COV-2, NAA: NOT DETECTED

## 2020-06-09 NOTE — H&P
[address] [facility], [address] [facility]  [phone]                     History and Physical     NAME: Keyonna Melgar   :  1960   MRN:  [id number]     HPI:  Pt with personal hx of colon polyps. Pt had cecal serrated flat polyp removed 1 yr ago. This a year f/u of polyp removal. PT reports no stx. Past Surgical History:   Procedure Laterality Date    HX APPENDECTOMY      HX CHOLECYSTECTOMY      HX GYN      HX HEENT      HX OTHER SURGICAL      Total thyroidectomy     Past Medical History:   Diagnosis Date    Headache     Hypertension     Hypothyroidism     Insomnia     Melanoma ([facility] [address]     Mild cognitive impairment     Muscle pain      Social History     Tobacco Use    Smoking status: Former Smoker    Smokeless tobacco: Never Used    Tobacco comment: quit in -only smoked for 4 years in college   Substance Use Topics    Alcohol use:  Yes     Alcohol/week: 0.0 standard drinks     Comment: 2x per year    Drug use: No     Allergies   Allergen Reactions    Latex Rash    Ciprofloxacin Other (comments)     Interacts with thyroid medication    Contrast Agent [Iodine] Other (comments)     Causes high blood pressure and dizziness    Hydrochlorothiazide (Bulk) Other (comments)     Makes bp elevated & alters thyroid levels    Nitrofurantoin Monohyd/M-Cryst Nausea Only    Codeine Nausea Only     Family History   Problem Relation Age of Onset    Dementia Mother     Coronary Artery Disease Father     Heart Attack Father     Cancer Father     Heart Disease Father     Stroke Father     Parkinson's Disease Maternal Aunt     Mental Retardation Sister     Heart Disease Maternal Grandmother     Stroke Maternal Grandmother     Heart Disease Maternal Grandfather     Heart Disease Paternal Grandmother     Stroke Paternal Grandmother     Heart Disease Paternal Grandfather     Headache Other     Breast Cancer Paternal Aunt      No current facility-administered medications for this encounter. Current Outpatient Medications   Medication Sig    levothyroxine (Synthroid) 88 mcg tablet Take 88 mcg by mouth Daily (before breakfast).  liothyronine (CYTOMEL) 5 mcg tablet Take 5 mcg by mouth two (2) times a day. Takes 5mcg with synthroid, and in the afternoon takes 2.5mcg.  furosemide (Lasix) 20 mg tablet Take 20 mg by mouth as needed.  nebivoloL (Bystolic) 2.5 mg tablet Take 2.5 mg by mouth daily. Pt currently tapering, only takes 1/4 of pill daily    vitamin B complex (B COMPLEX 1 PO) Take  by mouth. 3 times weekly    cyclobenzaprine (FLEXERIL) 10 mg tablet TK 1 T PO QHS    vitamin E (AQUA GEMS) 400 unit capsule Take  by mouth. 4 times a week    cholecalciferol, vitamin D3, (VITAMIN D3) 2,000 unit tab Take  by mouth daily.  selenium 100 mcg tab Take  by mouth daily.  multivitamin (ONE A DAY) tablet Take 1 Tab by mouth daily.  magnesium oxide (MAG-OX) 400 mg tablet Take 400 mg by mouth daily.  krill-omega-3-dha-epa-lipids 629-27-53-80 mg cap Take  by mouth daily.  thyroid,pork (NP THYROID PO) Take  by mouth. 1 1/2 grain at 5am, 1 grain at 10 am and 1 grain at 3pm         PHYSICAL EXAM:  General: WD, WN. Alert, cooperative, no acute distress    HEENT: NC, Atraumatic. PERRLA, EOMI. Anicteric sclerae. Lungs:  CTA Bilaterally. No Wheezing/Rhonchi/Rales. Heart:  Regular  rhythm,  No murmur, No Rubs, No Gallops  Abdomen: Soft, Non distended, Non tender.  +Bowel sounds, no HSM  Extremities: No c/c/e  Neurologic:  CN 2-12 gi, Alert and oriented X 3. No acute neurological distress   Psych:   Good insight. Not anxious nor agitated.            Assessment:   I have reviewed with the patient +/- family alternatives,benefits and risks for the procedure, as well as potential complications(with emphasis on, but not limited to, bleeding, perforation, cardiovascular/cerebrovascular/pulmonary events, reactions to the medications, infection, risk of missing a lesions/a cancer, and the imponderables including death), alternative options, and patient/family voices understanding.       Plan:   · Endoscopic procedure  · Conscious sedation or MAC

## 2020-06-10 ENCOUNTER — ANESTHESIA EVENT (OUTPATIENT)
Dept: ENDOSCOPY | Age: 60
End: 2020-06-10
Payer: OTHER GOVERNMENT

## 2020-06-10 ENCOUNTER — HOSPITAL ENCOUNTER (OUTPATIENT)
Age: 60
Setting detail: OUTPATIENT SURGERY
Discharge: HOME | End: 2020-06-10
Attending: INTERNAL MEDICINE | Admitting: INTERNAL MEDICINE
Payer: OTHER GOVERNMENT

## 2020-06-10 ENCOUNTER — ANESTHESIA (OUTPATIENT)
Dept: ENDOSCOPY | Age: 60
End: 2020-06-10
Payer: OTHER GOVERNMENT

## 2020-06-10 VITALS
WEIGHT: 162.48 LBS | TEMPERATURE: 97.7 F | SYSTOLIC BLOOD PRESSURE: 124 MMHG | BODY MASS INDEX: 28.79 KG/M2 | DIASTOLIC BLOOD PRESSURE: 66 MMHG | HEIGHT: 63 IN | HEART RATE: 68 BPM | RESPIRATION RATE: 18 BRPM | OXYGEN SATURATION: 100 %

## 2020-06-10 PROCEDURE — 74011250636 HC RX REV CODE- 250/636: Performed by: NURSE ANESTHETIST, CERTIFIED REGISTERED

## 2020-06-10 PROCEDURE — 88305 TISSUE EXAM BY PATHOLOGIST: CPT

## 2020-06-10 PROCEDURE — 76060000031 HC ANESTHESIA FIRST 0.5 HR: Performed by: INTERNAL MEDICINE

## 2020-06-10 PROCEDURE — 77030021593 HC FCPS BIOP ENDOSC BSC -A: Performed by: INTERNAL MEDICINE

## 2020-06-10 PROCEDURE — 76040000019: Performed by: INTERNAL MEDICINE

## 2020-06-10 RX ORDER — MIDAZOLAM HYDROCHLORIDE 1 MG/ML
.25-5 INJECTION, SOLUTION INTRAMUSCULAR; INTRAVENOUS
Status: DISCONTINUED | OUTPATIENT
Start: 2020-06-10 | End: 2020-06-10 | Stop reason: HOSPADM

## 2020-06-10 RX ORDER — FLUMAZENIL 0.1 MG/ML
0.2 INJECTION INTRAVENOUS
Status: DISCONTINUED | OUTPATIENT
Start: 2020-06-10 | End: 2020-06-10 | Stop reason: HOSPADM

## 2020-06-10 RX ORDER — DEXTROMETHORPHAN/PSEUDOEPHED 2.5-7.5/.8
1.2 DROPS ORAL
Status: DISCONTINUED | OUTPATIENT
Start: 2020-06-10 | End: 2020-06-10 | Stop reason: HOSPADM

## 2020-06-10 RX ORDER — NALOXONE HYDROCHLORIDE 0.4 MG/ML
0.4 INJECTION, SOLUTION INTRAMUSCULAR; INTRAVENOUS; SUBCUTANEOUS
Status: DISCONTINUED | OUTPATIENT
Start: 2020-06-10 | End: 2020-06-10 | Stop reason: HOSPADM

## 2020-06-10 RX ORDER — ATROPINE SULFATE 0.1 MG/ML
0.5 INJECTION INTRAVENOUS
Status: DISCONTINUED | OUTPATIENT
Start: 2020-06-10 | End: 2020-06-10 | Stop reason: HOSPADM

## 2020-06-10 RX ORDER — SODIUM CHLORIDE 9 MG/ML
50 INJECTION, SOLUTION INTRAVENOUS CONTINUOUS
Status: DISCONTINUED | OUTPATIENT
Start: 2020-06-10 | End: 2020-06-10 | Stop reason: HOSPADM

## 2020-06-10 RX ORDER — EPINEPHRINE 0.1 MG/ML
1 INJECTION INTRACARDIAC; INTRAVENOUS
Status: DISCONTINUED | OUTPATIENT
Start: 2020-06-10 | End: 2020-06-10 | Stop reason: HOSPADM

## 2020-06-10 RX ORDER — SODIUM CHLORIDE 9 MG/ML
INJECTION, SOLUTION INTRAVENOUS
Status: DISCONTINUED | OUTPATIENT
Start: 2020-06-10 | End: 2020-06-10 | Stop reason: HOSPADM

## 2020-06-10 RX ORDER — PROPOFOL 10 MG/ML
INJECTION, EMULSION INTRAVENOUS AS NEEDED
Status: DISCONTINUED | OUTPATIENT
Start: 2020-06-10 | End: 2020-06-10 | Stop reason: HOSPADM

## 2020-06-10 RX ADMIN — PROPOFOL 30 MG: 10 INJECTION, EMULSION INTRAVENOUS at 08:45

## 2020-06-10 RX ADMIN — SODIUM CHLORIDE: 9 INJECTION, SOLUTION INTRAVENOUS at 08:33

## 2020-06-10 RX ADMIN — PROPOFOL 40 MG: 10 INJECTION, EMULSION INTRAVENOUS at 08:53

## 2020-06-10 RX ADMIN — PROPOFOL 40 MG: 10 INJECTION, EMULSION INTRAVENOUS at 08:42

## 2020-06-10 RX ADMIN — PROPOFOL 60 MG: 10 INJECTION, EMULSION INTRAVENOUS at 08:48

## 2020-06-10 RX ADMIN — PROPOFOL 50 MG: 10 INJECTION, EMULSION INTRAVENOUS at 08:44

## 2020-06-10 RX ADMIN — PROPOFOL 80 MG: 10 INJECTION, EMULSION INTRAVENOUS at 08:40

## 2020-06-10 RX ADMIN — PROPOFOL 40 MG: 10 INJECTION, EMULSION INTRAVENOUS at 08:51

## 2020-06-10 NOTE — ANESTHESIA POSTPROCEDURE EVALUATION
Procedure(s):  COLONOSCOPY  ENDOSCOPIC POLYPECTOMY  COLON BIOPSY. MAC    Anesthesia Post Evaluation      Multimodal analgesia: multimodal analgesia used between 6 hours prior to anesthesia start to PACU discharge  Patient location during evaluation: bedside  Patient participation: complete - patient participated  Level of consciousness: awake  Pain management: adequate  Airway patency: patent  Anesthetic complications: no  Cardiovascular status: acceptable  Respiratory status: acceptable  Hydration status: acceptable        INITIAL Post-op Vital signs:   Vitals Value Taken Time   /66 6/10/2020  9:19 AM   Temp 36.5 °C (97.7 °F) 6/10/2020  9:04 AM   Pulse 63 6/10/2020  9:22 AM   Resp 21 6/10/2020  9:22 AM   SpO2 100 % 6/10/2020  9:22 AM   Vitals shown include unvalidated device data.

## 2020-06-10 NOTE — PROGRESS NOTES

## 2020-06-10 NOTE — DISCHARGE INSTRUCTIONS
[address] [facility]  [address] [facility], [address] [facility]  [phone]                   Brittny Leaver  [id number]  1960    COLON DISCHARGE INSTRUCTIONS    DISCOMFORT:  Redness at IV site- apply warm compress to area; if redness or soreness persist- contact your physician  There may be a slight amount of blood passed from the rectum  Gaseous discomfort- walking, belching will help relieve any discomfort  You may not operate a vehicle for 12 hours  You may not  engage in an occupation involving machinery or appliances for rest of today  You may not  drink alcoholic beverages for at least 12 hours  Avoid making any critical decisions for at least 24 hour    DIET:   High fiber diet. - however -  remember your colon is empty and a heavy meal will produce gas. Avoid these foods:  vegetables, fried / greasy foods, carbonated drinks for today     ACTIVITY:  It is recommended that you spend the remainder of the day resting -  avoid any strenuous activity. CALL M.D. ANY SIGN OF:   Increasing pain, nausea, vomiting  Abdominal distension (swelling)  New increased bleeding (oral or rectal)  Fever (chills)  Pain in chest area  Bloody discharge from nose or mouth  Shortness of breath    You may resume medications    Post procedure diagnosis:    - Colonic submucosal lesion located in appendiceal orifice  -Small sigmoid polyp- removed  - Previous Polyp scar in cecum  .- Hemorrhoids  .- Diverticulosis    Follow-up Instructions:    If a specimen was collected, you will receive a letter with the result by mail within two  weeks. Depending on the result this letter will specify your follow up colonoscopy date.       Please call us for any questions or concerns                     Brittny Lozadar  [id number]  1960        DISCHARGE SUMMARY from Nurse    The following personal items collected during your admission are returned to you:   Dental Appliance: Dental Appliances: None  Vision: Visual Aid: Glasses  Hearing Aid:    Jewelry:    Clothing:    Other Valuables:    Valuables sent to safe:       Patient Education        Learning About Diverticulosis and Diverticulitis  What are diverticulosis and diverticulitis? In diverticulosis and diverticulitis, pouches called diverticula form in the wall of the large intestine, or colon. · In diverticulosis, the pouches do not cause any pain or other symptoms. · In diverticulitis, the pouches get inflamed or infected and cause symptoms. Doctors aren't sure what causes these pouches in the colon. But they think that a low-fiber diet may play a role. Without fiber to add bulk to the stool, the colon has to work harder than normal to push the stool forward. The pressure from this may cause pouches to form in weak spots along the colon. Some people with diverticulosis get diverticulitis. But experts don't know why this happens. What are the symptoms? · In diverticulosis, most people don't have symptoms. But pouches sometimes bleed. · In diverticulitis, symptoms may last from a few hours to a week or more. They include:  ? Belly pain. This is usually in the lower left side. It is sometimes worse when you move. This is the most common symptom. ? Fever and chills. ? Bloating and gas. ? Diarrhea or constipation. ? Nausea and sometimes vomiting.  ? Not feeling like eating. How can you prevent diverticulitis? You may be able to lower your chance of getting diverticulitis. You can do this by taking steps to prevent constipation. · Eat fruits, vegetables, beans, and whole grains every day. These foods are high in fiber. · Drink plenty of fluids. (Drink enough so that your urine is light yellow or clear like water.) If you have kidney, heart, or liver disease and have to limit fluids, talk with your doctor before you increase the amount of fluids you drink. · Get at least 30 minutes of exercise on most days of the week. Walking is a good choice.  You also may want to do other activities, such as running, swimming, cycling, or playing tennis or team sports. · Take a fiber supplement, such as Citrucel or Metamucil, every day if needed. Read and follow all instructions on the label. · Schedule time each day for a bowel movement. Having a daily routine may help. Take your time and do not strain when having a bowel movement. Some people avoid nuts, seeds, berries, and popcorn. They believe that these foods might get trapped in the diverticula and cause pain. But there is no proof that these foods cause diverticulitis or make it worse. How are these problems treated? · The best way to treat diverticulosis is to avoid constipation. · Treatment for diverticulitis includes antibiotics. It often includes a change in your diet. You may need only liquids at first. Your doctor may suggest pain medicines for pain or belly cramps. In some cases, surgery may be needed. Follow-up care is a key part of your treatment and safety. Be sure to make and go to all appointments, and call your doctor if you are having problems. It's also a good idea to know your test results and keep a list of the medicines you take. Where can you learn more? Go to http://marko-francoise.info/  Enter W138 in the search box to learn more about \"Learning About Diverticulosis and Diverticulitis. \"  Current as of: August 12, 2019               Content Version: 12.5  © 3001-5639 Healthwise, Incorporated. Care instructions adapted under license by [employer] (which disclaims liability or warranty for this information). If you have questions about a medical condition or this instruction, always ask your healthcare professional. [address] any warranty or liability for your use of this information.

## 2020-06-10 NOTE — ROUTINE PROCESS
Power Posey  1960  [id number]    Situation:  Verbal report received from: MITA De Anda RN  Procedure: Procedure(s):  COLONOSCOPY  ENDOSCOPIC POLYPECTOMY  COLON BIOPSY    Background:    Preoperative diagnosis: PERSONAL HX COLON POLYPS  Postoperative diagnosis: 1.- Colonic Polyp  2.- Hemorrhoids  3.- Diverticulosis    :  Dr. Kath De Guzman  Assistant(s): Endoscopy Technician-1: Luisito Ulloa  Endoscopy RN-1: Isha Dawn RN    Specimens:   ID Type Source Tests Collected by Time Destination   1 : Sigmoid Colon Polyp Preservative   Gray Rolon MD 6/10/2020 1433 Pathology   2 : Appendix Biopsy Preservative   Gray Rolon MD 6/10/2020 0240 Pathology   3 : Cecum Polypectomy Site Biopsy Preservative   Gray Rolon MD 6/10/2020 2119 Pathology     H. Pylori  no    Assessment:  Intra-procedure medications       Anesthesia gave intra-procedure sedation and medications, see anesthesia flow sheet yes    Intravenous fluids: NS@ KVO     Vital signs stable     Abdominal assessment: round and soft     Recommendation:  Discharge patient per MD order. Family or Friend   Permission to share finding with family or friend yes    Endoscopy discharge instructions have been reviewed and given to patient. The patient verbalized understanding and acceptance of instructions.

## 2020-06-10 NOTE — ANESTHESIA PREPROCEDURE EVALUATION
Relevant Problems   No relevant active problems       Anesthetic History   No history of anesthetic complications            Review of Systems / Medical History  Patient summary reviewed, nursing notes reviewed and pertinent labs reviewed    Pulmonary  Within defined limits                 Neuro/Psych   Within defined limits           Cardiovascular    Hypertension: well controlled            Pertinent negatives: Hyperlipidemia: borderline.   Exercise tolerance: >4 METS     GI/Hepatic/Renal  Within defined limits              Endo/Other      Hypothyroidism: well controlled  Cancer (h/o melanoma)     Other Findings              Physical Exam    Airway  Mallampati: I  TM Distance: 4 - 6 cm  Neck ROM: normal range of motion   Mouth opening: Normal     Cardiovascular    Rhythm: regular  Rate: normal         Dental    Dentition: Upper dentition intact, Lower dentition intact and Caps/crowns     Pulmonary  Breath sounds clear to auscultation               Abdominal         Other Findings            Anesthetic Plan    ASA: 2  Anesthesia type: MAC          Induction: Intravenous  Anesthetic plan and risks discussed with: Patient

## 2020-06-10 NOTE — PROCEDURES
[address]  [address] [facility], [address] [facility]  [phone]                   Colonoscopy Operative Report      Indications:    Personal history of colon polyps (screening only)     :  Kevin Norman MD    Assistants: None    Referring Provider: Kim Roach DO    Sedation:  MAC anesthesia Propofol    Procedure Details:  After informed consent was obtained with all risks and benefits of procedure explained and preoperative exam completed, the patient was taken to the endoscopy suite and placed in the left lateral decubitus position. Upon sequential sedation as per above, a digital rectal exam was performed  And was normal.  The Olympus videocolonoscope  was inserted in the rectum and carefully advanced to the cecum, which was identified by the ileocecal valve and appendiceal orifice, terminal ileum. The quality of preparation was excellent. The colonoscope was slowly withdrawn with careful evaluation between folds. Retroflexion in the rectum was performed and was normal..     Findings:   Rectum: Grade 1 internal hemorrhoid(s); Sigmoid:     - Diverticulosis                   - Small 3 mm hyperplastic appearing polyp. BX removed  Descending Colon: normal  Transverse Colon: normal  Ascending Colon: normal  Cecum: 10 mm submucosal appendiceal orifice lesion. Previously 8 mm in size. Bx taken                 Previous polypectomy site noted. No obvious residual polyp noted. Bx taken . Terminal Ileum: normal    Interventions:  As above    Specimens :  specimen #1, 3 mm in size, located in the sigmoid removed by cold biopsy and sent for pathology  #2, 10 mm in size, located in appendiceal orifice biopsied  #3, 6 mm in size, located in the cecum scar from previous polypecotmy site    Implants: none    Complications: None. EBL:  None.     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  -Call office for pathology results in 1 week. -Repeat colonoscopy in 3 years.  -High fiber diet. Resume normal medication(s)  -CT scan of abdomen/plevis ( if not done recent)   -Referal to surgeon electively for appendiceal orifice submucosal lesion removal.      Discharge Disposition:  Home in the company of a  when able to ambulate.     Bonnie Craig MD  6/10/2020  9:00 AM

## 2020-07-25 ENCOUNTER — HOSPITAL ENCOUNTER (OUTPATIENT)
Dept: CT IMAGING | Age: 60
Discharge: HOME | End: 2020-07-25
Attending: INTERNAL MEDICINE
Payer: OTHER GOVERNMENT

## 2020-07-25 DIAGNOSIS — K59.1 DIARRHEA, FUNCTIONAL: ICD-10-CM

## 2020-07-25 DIAGNOSIS — R10.13 ABDOMINAL PAIN, EPIGASTRIC: ICD-10-CM

## 2020-07-25 DIAGNOSIS — R63.4 WEIGHT LOSS, ABNORMAL: ICD-10-CM

## 2020-07-25 PROCEDURE — 74176 CT ABD & PELVIS W/O CONTRAST: CPT

## 2020-10-12 ENCOUNTER — HOSPITAL ENCOUNTER (OUTPATIENT)
Dept: MAMMOGRAPHY | Age: 60
Discharge: HOME | End: 2020-10-12
Attending: OBSTETRICS & GYNECOLOGY
Payer: OTHER GOVERNMENT

## 2020-10-12 DIAGNOSIS — Z12.31 VISIT FOR SCREENING MAMMOGRAM: ICD-10-CM

## 2020-10-12 PROCEDURE — 77067 SCR MAMMO BI INCL CAD: CPT

## 2021-03-12 ENCOUNTER — TELEPHONE (OUTPATIENT)
Dept: CARDIOLOGY CLINIC | Age: 61
End: 2021-03-12

## 2021-03-12 NOTE — TELEPHONE ENCOUNTER
Spoke to pt; pain in left arm; mild pain in right side of heart. For about 2-3 weeks. Father had MI at young age. 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    Made apt for Wed at [place] [address] at [facility].

## 2021-03-12 NOTE — TELEPHONE ENCOUNTER
Patient states she is having left pain in her arm. States it \"comes and goes and feels like hard squeezing. \" States she read up about how it could be possible heart issues and concerned since her father had a heart attack. Patient is aware first available date is 04/08 but requesting a sooner date. 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      Phone: [phone]

## 2021-03-17 ENCOUNTER — OFFICE VISIT (OUTPATIENT)
Dept: CARDIOLOGY CLINIC | Age: 61
End: 2021-03-17
Payer: OTHER GOVERNMENT

## 2021-03-17 VITALS
WEIGHT: 169.8 LBS | BODY MASS INDEX: 30.09 KG/M2 | SYSTOLIC BLOOD PRESSURE: 150 MMHG | DIASTOLIC BLOOD PRESSURE: 86 MMHG | HEART RATE: 84 BPM | RESPIRATION RATE: 16 BRPM | HEIGHT: 63 IN | OXYGEN SATURATION: 98 %

## 2021-03-17 DIAGNOSIS — R00.2 PALPITATIONS: ICD-10-CM

## 2021-03-17 DIAGNOSIS — R07.9 CHEST PAIN, UNSPECIFIED TYPE: Primary | ICD-10-CM

## 2021-03-17 DIAGNOSIS — I10 ESSENTIAL HYPERTENSION: ICD-10-CM

## 2021-03-17 PROCEDURE — 93000 ELECTROCARDIOGRAM COMPLETE: CPT | Performed by: SPECIALIST

## 2021-03-17 PROCEDURE — 99214 OFFICE O/P EST MOD 30 MIN: CPT | Performed by: SPECIALIST

## 2021-03-17 RX ORDER — LOSARTAN POTASSIUM 25 MG/1
12.5 TABLET ORAL
Qty: 30 TAB | Refills: 6 | Status: SHIPPED | OUTPATIENT
Start: 2021-03-17 | End: 2021-11-22

## 2021-03-17 NOTE — PROGRESS NOTES
Jeimy Samuel MD. [facility]              Patient: Delphine Nevarez  : 1960      Today's Date: 3/17/2021          HISTORY OF PRESENT ILLNESS:     History of Present Illness:  She comes in after having pain in left arm (squeezing) and had right sided chest pain - was taking collegan peptide then- she stopped that and pain in heart stopped,  Better this week without arm pain. No exertional pain. Walks 3 flights of stairs at work. i                PAST MEDICAL HISTORY:     Past Medical History:   Diagnosis Date    Headache     Hypertension     Hypothyroidism     Insomnia     Melanoma ([address]     Mild cognitive impairment     Muscle pain          Past Surgical History:   Procedure Laterality Date    COLONOSCOPY N/A 6/10/2020    COL[facility] ENDOSCOPY    HX APPENDECTOMY      HX CHOLECYSTECTOMY      HX GYN      HX HEENT      HX OTHER SURGICAL      Total thyroidectomy           MEDICATIONS:     Current Outpatient Medications   Medication Sig Dispense Refill    levothyroxine (Synthroid) 88 mcg tablet Take 88 mcg by mouth Daily (before breakfast).  liothyronine (CYTOMEL) 5 mcg tablet Take 5 mcg by mouth. Takes 5mcg with synthroid, and in the afternoon takes 2.5mcg.  furosemide (Lasix) 20 mg tablet Take 20 mg by mouth as needed.  nebivoloL (Bystolic) 2.5 mg tablet Take 2.5 mg by mouth daily. Pt currently tapering, only takes 1/4 of pill daily      vitamin B complex (B COMPLEX 1 PO) Take  by mouth daily.  cyclobenzaprine (FLEXERIL) 10 mg tablet TK 1 T PO QHS  1    vitamin E (AQUA GEMS) 400 unit capsule Take  by mouth. 4 times a week      cholecalciferol, vitamin D3, (VITAMIN D3) 2,000 unit tab Take  by mouth daily.  selenium 100 mcg tab Take  by mouth daily.  multivitamin (ONE A DAY) tablet Take 1 Tab by mouth daily.  magnesium oxide (MAG-OX) 400 mg tablet Take 400 mg by mouth daily.       krill-omega-3-dha-epa-lipids 906-91-53-89 mg cap Take  by mouth daily. Allergies   Allergen Reactions    Latex Rash    Ciprofloxacin Other (comments)     Interacts with thyroid medication    Contrast Agent [Iodine] Other (comments)     Causes high blood pressure and dizziness    Hydrochlorothiazide (Bulk) Other (comments)     Makes bp elevated & alters thyroid levels    Nitrofurantoin Monohyd/M-Cryst Nausea Only    Codeine Nausea Only             SOCIAL HISTORY:     Social History     Tobacco Use    Smoking status: Former Smoker     Packs/day: 0.50     Years: 10.00     Pack years: 5.00     Quit date: 6/10/1990     Years since quittin.7    Smokeless tobacco: Never Used    Tobacco comment: quit in -only smoked for 4 years in college   Substance Use Topics    Alcohol use: Yes     Alcohol/week: 0.0 standard drinks     Comment: 2x per year    Drug use: No         FAMILY HISTORY:     Family History   Problem Relation Age of Onset    Dementia Mother     Coronary Artery Disease Father     Heart Attack Father     Cancer Father     Heart Disease Father     Stroke Father     Parkinson's Disease Maternal Aunt     Mental Retardation Sister     Heart Disease Maternal Grandmother     Stroke Maternal Grandmother     Heart Disease Maternal Grandfather     Heart Disease Paternal Grandmother     Stroke Paternal Grandmother     Heart Disease Paternal Grandfather     Headache Other     Breast Cancer Paternal Aunt           REVIEW OF SYMPTOMS:      Review of Symptoms:  Constitutional: Negative for fever, chills  HEENT: Negative for nosebleeds, tinnitus, and vision changes. Respiratory: Negative for cough, wheezing  Cardiovascular: Negative for orthopnea, claudication, syncope, and PND. Gastrointestinal: Negative for abdominal pain, diarrhea, melena. Genitourinary: Negative for dysuria  Musculoskeletal: Negative for myalgias.    Skin: Negative for rash  Heme: No problems bleeding. Neurological: Negative for speech change and focal weakness.                     PHYSICAL EXAM:      Physical Exam:  Visit Vitals  BP (!) 150/86 (BP 1 Location: Left upper arm, BP Patient Position: Sitting, BP Cuff Size: Adult)   Pulse 84   Resp 16   Ht 5' 3\" (1.6 m)   Wt 169 lb 12.8 oz (77 kg)   LMP  (LMP Unknown)   SpO2 98%   BMI 30.08 kg/m²       Patient appears generally well, mood and affect are appropriate and pleasant. HEENT:  Hearing intact, non-icteric, normocephalic, atraumatic. Neck Exam: Supple, No JVD or carotid bruits. Lung Exam: Clear to auscultation, even breath sounds. Cardiac Exam: Regular rate and rhythm with no murmur  Abdomen: Soft, non-tender, normal bowel sounds. No bruits or masses. Extremities: Moves all ext well. No lower extremity edema. Vascular: 2+ dorsalis pedis pulses bilaterally.   Psych: Appropriate affect  Neuro - Grossly intact           LABS / OTHER STUDIES:      Labs 5/15 - chol 191, TG 74, HDL 72,   Labs 12/22/15 - CBC OK, TSH 0.04  Labs 9/18 -CBC and CMP OK, chol 200, TG 62, , HDL 71  Labs 12/18 - CMP OK,   Labs 2/17 - TSH 0.007, T3 226,   Labs 6/20 - BMP normal            CARDIAC DIAGNOSTICS:      Cardiac Evaluation Includes:  Stress echo 2/6/12 - patient walked 5 min; normal stress EKG and echo.  Resting echo images showed mild mitral regurgitation.      Holter 2/8/12 - NSR with HR ; one hour of frequent PAC's, one 6 beat run of ectopic atrial tach, 2 PVC's, Diary entries (CP, fast HR) correlate with NSR rate .     Stress Echo 2/15/16 - walked 2:13 (4.6 METS).  No CP.  Normal stress EKG and stress echo.  Extra images show normal heart anatomy and normal PA pressures.      CT Heart Scan 2/18/16 - CAC Score 0      Carotid Doppler 1/16/17 - 1-15% stenosis bilat      Echo 8/17 () - LVEF 60%     Holter 8/17 - NSR, rare PVC's, 2 brief runs of afib (per report)         EKG 2/6/12 - NSR, normal  EKG 2/8/16 - NSR, normal   EKG 3/18/19 - NSR, normal    EKG 3/17/21 - NSR, normal            ASSESSMENT AND PLAN:      Assessment and Plan:  1) Chest and arm pain after taking collagen peptides  - doing better past week off of supplement   - Symptoms atypical for angina ---> check a stress echo      2) Brief afib per Holter report   - Dr. Lionel Benavides noted some transient afib on 8/17 Holter  - Her [id number] score is borderline 2   - was never on a blood thinner and no more afib otherwise   - will follow and consider [address] if any more Afib   - denies any palpitations lately      3) CV risk assessment   - CAC Score was 0 in 2016 indicating low CV risk      4) HTN - high in the office but better at home - many days < 130/80 -- she is trying to come off medications. Due to weight loss concern of bystolic, will switch to losartan 12.5 mg daily. - follow BP at home and Dr. Shasha Carbone can manage HTN long term     5) Phone FU. See me back as needed. Patient expressed understanding of the plan - questions were answered.          - private school.           Tyrell Shetty MD, [address], [address]      [id number]  [facility], [address] [place] [address]  Ph: [phone]                                [phone]        ADDENDUM   4/7/2021  Stress Echo 4/6/21 - walked 2 min (4.6 METS). No CP or ischemic EKG changes. Normal stress echo. She says exercise was limited since tough walking with a mask on. Will call      ADDENDUM   4/8/2021  I called with results.

## 2021-04-06 ENCOUNTER — ANCILLARY PROCEDURE (OUTPATIENT)
Dept: CARDIOLOGY CLINIC | Age: 61
End: 2021-04-06
Payer: OTHER GOVERNMENT

## 2021-04-06 ENCOUNTER — APPOINTMENT (OUTPATIENT)
Dept: CARDIOLOGY CLINIC | Age: 61
End: 2021-04-06

## 2021-04-06 VITALS
SYSTOLIC BLOOD PRESSURE: 138 MMHG | HEIGHT: 63 IN | DIASTOLIC BLOOD PRESSURE: 84 MMHG | BODY MASS INDEX: 29.95 KG/M2 | WEIGHT: 169 LBS

## 2021-04-06 DIAGNOSIS — R07.9 CHEST PAIN, UNSPECIFIED TYPE: ICD-10-CM

## 2021-04-06 DIAGNOSIS — I10 HYPERTENSION, UNSPECIFIED TYPE: ICD-10-CM

## 2021-04-06 DIAGNOSIS — Z82.49 FAMILY HISTORY OF PREMATURE CAD: ICD-10-CM

## 2021-04-06 LAB
ECHO AO ASC DIAM: 2.84 CM
STRESS ANGINA INDEX: 0
STRESS BASELINE DIAS BP: 84 MMHG
STRESS BASELINE HR: 81 BPM
STRESS BASELINE SYS BP: 138 MMHG
STRESS ESTIMATED WORKLOAD: 4.6 METS
STRESS EXERCISE DUR MIN: NORMAL
STRESS O2 SAT PEAK: 96 %
STRESS O2 SAT REST: 97 %
STRESS PEAK DIAS BP: 92 MMHG
STRESS PEAK SYS BP: 160 MMHG
STRESS PERCENT HR ACHIEVED: 98 %
STRESS POST PEAK HR: 157 BPM
STRESS RATE PRESSURE PRODUCT: NORMAL BPM*MMHG
STRESS ST DEPRESSION: 0 MM
STRESS ST ELEVATION: 0 MM
STRESS TARGET HR: 160 BPM

## 2021-04-06 PROCEDURE — 93351 STRESS TTE COMPLETE: CPT | Performed by: SPECIALIST

## 2021-08-04 ENCOUNTER — TRANSCRIBE ORDER (OUTPATIENT)
Dept: SCHEDULING | Age: 61
End: 2021-08-04

## 2021-08-04 DIAGNOSIS — Z12.31 SCREENING MAMMOGRAM FOR HIGH-RISK PATIENT: Primary | ICD-10-CM

## 2021-09-22 ENCOUNTER — TRANSCRIBE ORDER (OUTPATIENT)
Dept: SCHEDULING | Age: 61
End: 2021-09-22

## 2021-09-22 DIAGNOSIS — Z12.31 SCREENING MAMMOGRAM FOR HIGH-RISK PATIENT: Primary | ICD-10-CM

## 2021-10-22 ENCOUNTER — HOSPITAL ENCOUNTER (OUTPATIENT)
Dept: MAMMOGRAPHY | Age: 61
Discharge: HOME | End: 2021-10-22
Attending: OBSTETRICS & GYNECOLOGY
Payer: OTHER GOVERNMENT

## 2021-10-22 DIAGNOSIS — Z12.31 SCREENING MAMMOGRAM FOR HIGH-RISK PATIENT: ICD-10-CM

## 2021-10-22 PROCEDURE — 77063 BREAST TOMOSYNTHESIS BI: CPT

## 2021-11-22 ENCOUNTER — TRANSCRIBE ORDER (OUTPATIENT)
Dept: SCHEDULING | Age: 61
End: 2021-11-22

## 2021-11-22 ENCOUNTER — OFFICE VISIT (OUTPATIENT)
Dept: CARDIOLOGY CLINIC | Age: 61
End: 2021-11-22
Payer: OTHER GOVERNMENT

## 2021-11-22 VITALS
HEART RATE: 95 BPM | SYSTOLIC BLOOD PRESSURE: 154 MMHG | OXYGEN SATURATION: 95 % | HEIGHT: 63 IN | WEIGHT: 170 LBS | BODY MASS INDEX: 30.12 KG/M2 | DIASTOLIC BLOOD PRESSURE: 94 MMHG

## 2021-11-22 DIAGNOSIS — R06.02 SOB (SHORTNESS OF BREATH): ICD-10-CM

## 2021-11-22 DIAGNOSIS — R07.9 CHEST PAIN, UNSPECIFIED TYPE: Primary | ICD-10-CM

## 2021-11-22 DIAGNOSIS — Z13.6 SCREENING FOR CARDIOVASCULAR CONDITION: Primary | ICD-10-CM

## 2021-11-22 PROCEDURE — 99214 OFFICE O/P EST MOD 30 MIN: CPT | Performed by: SPECIALIST

## 2021-11-22 RX ORDER — LISINOPRIL 20 MG/1
20 TABLET ORAL DAILY
Qty: 30 TABLET | Refills: 0
Start: 2021-11-22

## 2021-11-22 NOTE — PROGRESS NOTES
Atul Steve is a 64 y.o. female    Visit Vitals  LMP  (LMP Unknown)     Vitals:    11/22/21 1014 11/22/21 1023   BP: (!) 156/92 (!) 154/94   BP 1 Location: Left upper arm Right upper arm   BP Patient Position: Sitting Sitting   BP Cuff Size: Adult Adult   Pulse: 95    Height: 5' 3\" (1.6 m)    Weight: 170 lb (77.1 kg)    SpO2: 95%            No chief complaint on file. Chest pain FEELS LIKE INDIGESTION   SOB NO  Dizziness NO  Swelling NO  Recent hospital visit NO  Refills NO  COVID VACCINE STATUS YES  HAD COVID?  NO

## 2021-11-22 NOTE — PROGRESS NOTES
Stacey García MD. [facility]              Patient: Mirna Temple  : 1960      Today's Date: 2021          HISTORY OF PRESENT ILLNESS:     History of Present Illness:    She is still have pain in left arm - worse when she raises her arms when she is driving. Arm pain can come on randomly. Not worse walking. SOB walking 3 flights of stairs wearing a mask. -130's at home. PAST MEDICAL HISTORY:     Past Medical History:   Diagnosis Date    Headache     Hypertension     Hypothyroidism     Insomnia     Melanoma ([address]     Mild cognitive impairment     Muscle pain          Past Surgical History:   Procedure Laterality Date    COLONOSCOPY N/A 6/10/2020    COL[facility] Liang Levin [facility]    HX APPENDECTOMY      HX CHOLECYSTECTOMY      HX GYN      HX HEENT      HX OTHER SURGICAL  1995    Total thyroidectomy         MEDICATIONS:     Current Outpatient Medications   Medication Sig Dispense Refill    vit C/E/Zn/coppr/lutein/zeaxan (PRESERVISION AREDS-2 PO) Take  by mouth.  lisinopriL (PRINIVIL, ZESTRIL) 20 mg tablet Take 1 Tablet by mouth daily. 30 Tablet 0    levothyroxine (Synthroid) 88 mcg tablet Take 88 mcg by mouth Daily (before breakfast).  liothyronine (CYTOMEL) 5 mcg tablet Take 5 mcg by mouth. Takes 5mcg with synthroid, and in the afternoon takes 2.5mcg.  furosemide (Lasix) 20 mg tablet Take 20 mg by mouth as needed.  vitamin B complex (B COMPLEX 1 PO) Take  by mouth daily.  cyclobenzaprine (FLEXERIL) 10 mg tablet TK 1 T PO QHS  1    vitamin E (AQUA GEMS) 400 unit capsule Take  by mouth. 4 times a week      cholecalciferol, vitamin D3, (VITAMIN D3) 2,000 unit tab Take  by mouth daily.  selenium 100 mcg tab Take  by mouth daily.  magnesium oxide (MAG-OX) 400 mg tablet Take 400 mg by mouth daily.  krill-omega-3-dha-epa-lipids 401-10-64-37 mg cap Take  by mouth daily. Allergies   Allergen Reactions    Latex Rash    Ciprofloxacin Other (comments)     Interacts with thyroid medication    Contrast Agent [Iodine] Other (comments)     Causes high blood pressure and dizziness    Hydrochlorothiazide (Bulk) Other (comments)     Makes bp elevated & alters thyroid levels    Nitrofurantoin Monohyd/M-Cryst Nausea Only    Codeine Nausea Only             SOCIAL HISTORY:     Social History     Tobacco Use    Smoking status: Former Smoker     Packs/day: 0.50     Years: 10.00     Pack years: 5.00     Quit date: 6/10/1990     Years since quittin.4    Smokeless tobacco: Never Used    Tobacco comment: quit in -only smoked for 4 years in [place]   Vaping Use    Vaping Use: Never used   Substance Use Topics    Alcohol use: Yes     Alcohol/week: 0.0 standard drinks     Comment: 2x per year    Drug use: No         FAMILY HISTORY:     Family History   Problem Relation Age of Onset   [de-identified] Dementia Mother     Coronary Art Dis Father     Heart Attack Father     Cancer Father     Heart Disease Father     Stroke Father     Parkinson's Disease Maternal Aunt     Mental Retardation Sister     Breast Cancer Sister 64    Heart Disease Maternal Grandmother     Stroke Maternal Grandmother     Heart Disease Maternal Grandfather     Heart Disease Paternal Grandmother     Stroke Paternal Grandmother     Heart Disease Paternal Grandfather     Headache Other     Breast Cancer Paternal Aunt           REVIEW OF SYMPTOMS:      Review of Symptoms:  Constitutional: Negative for fever, chills  HEENT: Negative for nosebleeds, tinnitus, and vision changes. Respiratory: Negative for cough, wheezing  Cardiovascular: Negative for orthopnea, claudication, syncope, and PND. Gastrointestinal: Negative for abdominal pain, diarrhea, melena. Genitourinary: Negative for dysuria  Musculoskeletal: Negative for myalgias. Skin: Negative for rash  Heme: No problems bleeding.   Neurological: Negative for speech change and focal weakness.                     PHYSICAL EXAM:      Physical Exam:  Visit Vitals  BP (!) 154/94 (BP 1 Location: Right upper arm, BP Patient Position: Sitting, BP Cuff Size: Adult)   Pulse 95   Ht 5' 3\" (1.6 m)   Wt 170 lb (77.1 kg)   LMP  (LMP Unknown)   SpO2 95%   BMI 30.11 kg/m²       Patient appears generally well, mood and affect are appropriate and pleasant. HEENT:  Hearing intact, non-icteric, normocephalic, atraumatic. Neck Exam: Supple, No JVD  Lung Exam: Clear to auscultation, even breath sounds. Cardiac Exam: Regular rate and rhythm with no murmur  Abdomen: Soft, non-tender  Extremities: Moves all ext well. No lower extremity edema. Psych: Appropriate affect  Neuro - Grossly intact           LABS / OTHER STUDIES:      Labs 5/15 - chol 191, TG 74, HDL 72,   Labs 12/22/15 - CBC OK, TSH 0.04  Labs 9/18 -CBC and CMP OK, chol 200, TG 62, , HDL 71  Labs 12/18 - CMP OK,   Labs 2/17 - TSH 0.007, T3 226,   Labs 6/20 - BMP normal            CARDIAC DIAGNOSTICS:      Cardiac Evaluation Includes:  Stress echo 2/6/12 - patient walked 5 min; normal stress EKG and echo.  Resting echo images showed mild mitral regurgitation.      Holter 2/8/12 - NSR with HR ; one hour of frequent PAC's, one 6 beat run of ectopic atrial tach, 2 PVC's, Diary entries (CP, fast HR) correlate with NSR rate .     Stress Echo 2/15/16 - walked 2:13 (4.6 METS).  No CP.  Normal stress EKG and stress echo.  Extra images show normal heart anatomy and normal PA pressures.      CT Heart Scan 2/18/16 - CAC Score 0      Carotid Doppler 1/16/17 - 1-15% stenosis bilat      Echo 8/17 () - LVEF 60%     Holter 8/17 - NSR, rare PVC's, 2 brief runs of afib (per report)     Stress Echo 4/6/21 - walked 2 min (4.6 METS). No CP or ischemic EKG changes. Normal stress echo.   She says exercise was limited since tough walking with a mask on.         EKG 2/6/12 - NSR, normal  EKG 2/8/16 - NSR, normal   EKG 3/18/19 - NSR, normal    EKG 3/17/21 - NSR, normal            ASSESSMENT AND PLAN:      Assessment and Plan:  1) Left arm squeezing   - comes on randomly - worse when she raises her arm to drive    - Stress Echo 4/6/21 - walked 2 min (4.6 METS). No CP or ischemic EKG changes. Normal stress echo but a poor workload. - Symptoms don't sound anginal in nature --- CV risk is low based on CAC score of 0 five years ago. Discussed options (nuclear study, CTA, CT heart scan) -- she decided to proceed with repeating a CT heart scan next --- if CAC score is still 0, then likelihood of severe CAD very low.    2) Brief afib per Holter report   - Dr. Joycelyn Mejias noted some transient afib on 8/17 Holter  - Her [id number] score is borderline 2   - was never on a blood thinner and no more afib otherwise   - will follow and consider [address] if any more Afib    - Some palpitations but no heart racing spells   - consider [employer] device (vs event monitor) to check rhythm at home      3) CV risk assessment   - CAC Score was 0 in 2016 indicating low CV risk      4) HTN - high in the office but better at home - many days < 130/80 -- continue lisinopril and follow home BP     5) Phone FU. See me back in one year. Patient expressed understanding of the plan - questions were answered.          - private school.           Shahzad Ba MD, [facility] [facility] [address] [place], [address]      [id number] [address]  [address]  [facility], [address] [place] [address]  Ph: [phone]                                [phone]

## 2021-11-23 ENCOUNTER — HOSPITAL ENCOUNTER (OUTPATIENT)
Dept: CT IMAGING | Age: 61
Discharge: HOME | End: 2021-11-23
Attending: SPECIALIST

## 2021-11-23 DIAGNOSIS — Z13.6 SCREENING FOR CARDIOVASCULAR CONDITION: ICD-10-CM

## 2021-11-23 PROCEDURE — 75571 CT HRT W/O DYE W/CA TEST: CPT

## 2021-11-24 ENCOUNTER — TELEPHONE (OUTPATIENT)
Dept: CARDIOLOGY CLINIC | Age: 61
End: 2021-11-24

## 2021-11-24 NOTE — TELEPHONE ENCOUNTER
Called pt,   Per Dr. Marjan Gates: Loy Simmonds you please let her know that CAC score was 0.  Very low likelihood of significant CAD.  Thanks. \"

## 2022-05-04 ENCOUNTER — TRANSCRIBE ORDER (OUTPATIENT)
Dept: SCHEDULING | Age: 62
End: 2022-05-04

## 2022-05-04 DIAGNOSIS — R10.2 PELVIC PAIN: ICD-10-CM

## 2022-05-04 DIAGNOSIS — R10.9 UNSPECIFIED ABDOMINAL PAIN: Primary | ICD-10-CM

## 2022-05-13 ENCOUNTER — TRANSCRIBE ORDER (OUTPATIENT)
Dept: SCHEDULING | Age: 62
End: 2022-05-13

## 2022-05-13 DIAGNOSIS — R10.2 PERINEAL NEURALGIA: ICD-10-CM

## 2022-05-13 DIAGNOSIS — R10.9 STOMACH ACHE: Primary | ICD-10-CM

## 2022-05-27 ENCOUNTER — HOSPITAL ENCOUNTER (OUTPATIENT)
Dept: ULTRASOUND IMAGING | Age: 62
Discharge: HOME | End: 2022-05-27
Attending: INTERNAL MEDICINE
Payer: OTHER GOVERNMENT

## 2022-05-27 ENCOUNTER — APPOINTMENT (OUTPATIENT)
Dept: ULTRASOUND IMAGING | Age: 62
End: 2022-05-27
Attending: INTERNAL MEDICINE
Payer: OTHER GOVERNMENT

## 2022-05-27 DIAGNOSIS — R10.9 UNSPECIFIED ABDOMINAL PAIN: ICD-10-CM

## 2022-05-27 DIAGNOSIS — R10.2 PERINEAL NEURALGIA: ICD-10-CM

## 2022-05-27 DIAGNOSIS — R10.2 PELVIC PAIN: ICD-10-CM

## 2022-05-27 DIAGNOSIS — R10.9 STOMACH ACHE: ICD-10-CM

## 2022-05-27 PROCEDURE — 76830 TRANSVAGINAL US NON-OB: CPT

## 2022-05-27 PROCEDURE — 76700 US EXAM ABDOM COMPLETE: CPT

## 2022-11-22 ENCOUNTER — OFFICE VISIT (OUTPATIENT)
Dept: CARDIOLOGY CLINIC | Age: 62
End: 2022-11-22
Payer: OTHER GOVERNMENT

## 2022-11-22 VITALS
WEIGHT: 178 LBS | DIASTOLIC BLOOD PRESSURE: 78 MMHG | OXYGEN SATURATION: 98 % | BODY MASS INDEX: 31.54 KG/M2 | HEIGHT: 63 IN | HEART RATE: 93 BPM | SYSTOLIC BLOOD PRESSURE: 138 MMHG

## 2022-11-22 DIAGNOSIS — I10 HYPERTENSION, UNSPECIFIED TYPE: Primary | ICD-10-CM

## 2022-11-22 DIAGNOSIS — R00.2 PALPITATIONS: ICD-10-CM

## 2022-11-22 PROCEDURE — 99214 OFFICE O/P EST MOD 30 MIN: CPT | Performed by: SPECIALIST

## 2022-11-22 PROCEDURE — 3074F SYST BP LT 130 MM HG: CPT | Performed by: SPECIALIST

## 2022-11-22 PROCEDURE — 93000 ELECTROCARDIOGRAM COMPLETE: CPT | Performed by: SPECIALIST

## 2022-11-22 PROCEDURE — 3078F DIAST BP <80 MM HG: CPT | Performed by: SPECIALIST

## 2022-11-22 NOTE — PROGRESS NOTES
Chief Complaint   Patient presents with    Follow-up     Annual     Vitals:    11/22/22 0844   BP: 138/78   BP 1 Location: Left upper arm   BP Patient Position: Sitting   BP Cuff Size: Large adult   Pulse: 93   Height: 5' 3\" (1.6 m)   Weight: 178 lb (80.7 kg)   SpO2: 98%     Chest pain denied   SOB denied   Palpitations denied   Swelling in hands/feet denied   Dizziness denied   Recent hospital stays denied   Refills denied     Last time had left arm pain; was having a disk issue; this has been resolved.

## 2022-11-22 NOTE — PROGRESS NOTES
Saba Spaulding MD. [facility]              Patient: Jared Cotter  : 1960      Today's Date: 2022          HISTORY OF PRESENT ILLNESS:     History of Present Illness:    Arm pain is better after seeing chiropractor. Has gained some weight. No cardiac complaints. She feels well. BP OK at home. PAST MEDICAL HISTORY:     Past Medical History:   Diagnosis Date    Headache     Hypertension     Hypothyroidism     Insomnia     Melanoma ([address]     Mild cognitive impairment     Muscle pain          Past Surgical History:   Procedure Laterality Date    COLONOSCOPY N/A 6/10/2020    COLONOSCOPY performed by Lexa Guidry MD at [facility] END[facility]    HX APPENDECTOMY      HX CHOLECYSTECTOMY      HX GYN      HX HEENT      HX OTHER SURGICAL      Total thyroidectomy         MEDICATIONS:     Current Outpatient Medications   Medication Sig Dispense Refill    vit C/E/Zn/coppr/lutein/zeaxan (PRESERVISION AREDS-2 PO) Take  by mouth.      lisinopriL (PRINIVIL, ZESTRIL) 20 mg tablet Take 1 Tablet by mouth daily. 30 Tablet 0    levothyroxine (Synthroid) 88 mcg tablet Take 88 mcg by mouth Daily (before breakfast). liothyronine (CYTOMEL) 5 mcg tablet Take 5 mcg by mouth. Takes 5mcg with synthroid, and in the afternoon takes 2.5mcg. furosemide (Lasix) 20 mg tablet Take 20 mg by mouth as needed. vitamin B complex (B COMPLEX 1 PO) Take  by mouth daily. cyclobenzaprine (FLEXERIL) 10 mg tablet TK 1 T PO QHS  1    vitamin E (AQUA GEMS) 400 unit capsule Take  by mouth. 4 times a week      cholecalciferol, vitamin D3, (VITAMIN D3) 2,000 unit tab Take  by mouth daily. selenium 100 mcg tab Take  by mouth daily. magnesium oxide (MAG-OX) 400 mg tablet Take 400 mg by mouth daily. krill-omega-3-dha-epa-lipids 169-22-30-42 mg cap Take  by mouth daily.            Allergies   Allergen Reactions    Latex Rash    Ciprofloxacin Other (comments)     Interacts with thyroid medication Contrast Agent [Iodine] Other (comments)     Causes high blood pressure and dizziness    Hydrochlorothiazide (Bulk) Other (comments)     Makes bp elevated & alters thyroid levels    Nitrofurantoin Monohyd/M-Cryst Nausea Only    Codeine Nausea Only             SOCIAL HISTORY:     Social History     Tobacco Use    Smoking status: Former     Packs/day: 0.50     Years: 10.00     Pack years: 5.00     Types: Cigarettes     Quit date: 6/10/1990     Years since quittin.4    Smokeless tobacco: Never    Tobacco comments:     quit in -only smoked for 4 years in [employer]   Vaping Use    Vaping Use: Never used   Substance Use Topics    Alcohol use: Yes     Alcohol/week: 0.0 standard drinks     Comment: 2x per year    Drug use: No         FAMILY HISTORY:     Family History   Problem Relation Age of Onset    Dementia Mother     Coronary Art Dis Father     Heart Attack Father     Cancer Father     Heart Disease Father     Stroke Father     Parkinson's Disease Maternal Aunt     Mental Retardation Sister     Breast Cancer Sister 64    Heart Disease Maternal Grandmother     Stroke Maternal Grandmother     Heart Disease Maternal Grandfather     Heart Disease Paternal Grandmother     Stroke Paternal Grandmother     Heart Disease Paternal Grandfather     Headache Other     Breast Cancer Paternal Aunt           REVIEW OF SYMPTOMS:      Review of Symptoms:  Constitutional: Negative for fever, chills  HEENT: Negative for nosebleeds, tinnitus, and vision changes. Respiratory: Negative for cough, wheezing  Cardiovascular: Negative for orthopnea, claudication, syncope, and PND. Gastrointestinal: Negative for abdominal pain, diarrhea, melena. Genitourinary: Negative for dysuria  Musculoskeletal: Negative for myalgias. Skin: Negative for rash  Heme: No problems bleeding. Neurological: Negative for speech change and focal weakness.                     PHYSICAL EXAM:     Physical Exam:  Visit Vitals  /78 (BP 1 Location: Left upper arm, BP Patient Position: Sitting, BP Cuff Size: Large adult)   Pulse 93   Ht 5' 3\" (1.6 m)   Wt 178 lb (80.7 kg)   LMP  (LMP Unknown)   SpO2 98%   BMI 31.53 kg/m²     Patient appears generally well, mood and affect are appropriate and pleasant. HEENT:  Hearing intact, non-icteric, normocephalic, atraumatic. Neck Exam: Supple, No JVD   Lung Exam: Clear to auscultation, even breath sounds. Cardiac Exam: Regular rate and rhythm with no murmur or rub  Abdomen: Soft, non-tender  Extremities: Moves all ext well. No lower extremity edema. MSKTL: Overall good ROM ext  Skin: No significant rashes  Psych: Appropriate affect  Neuro - Grossly intact             LABS / OTHER STUDIES:      Labs 5/15 - chol 191, TG 74, HDL 72,   Labs 12/22/15 - CBC OK, TSH 0.04  Labs 9/18 -CBC and CMP OK, chol 200, TG 62, , HDL 71  Labs 12/18 - CMP OK,   Labs 2/17 - TSH 0.007, T3 226,   Labs 6/20 - BMP normal            CARDIAC DIAGNOSTICS:      Cardiac Evaluation Includes:  Stress echo 2/6/12 - patient walked 5 min; normal stress EKG and echo. Resting echo images showed mild mitral regurgitation. Holter 2/8/12 - NSR with HR ; one hour of frequent PAC's, one 6 beat run of ectopic atrial tach, 2 PVC's, Diary entries (CP, fast HR) correlate with NSR rate . Stress Echo 2/15/16 - walked 2:13 (4.6 METS). No CP. Normal stress EKG and stress echo. Extra images show normal heart anatomy and normal PA pressures. CT Heart Scan 2/18/16 - CAC Score 0      Carotid Doppler 1/16/17 - 1-15% stenosis bilat      Echo 8/17 () - LVEF 60%     Holter 8/17 - NSR, rare PVC's, 2 brief runs of afib (per report)     Stress Echo 4/6/21 - walked 2 min (4.6 METS). No CP or ischemic EKG changes. Normal stress echo. She says exercise was limited since tough walking with a mask on.          EKG 2/6/12 - NSR, normal  EKG 2/8/16 - NSR, normal   EKG 3/18/19 - NSR, normal    EKG 3/17/21 - NSR, normal   EKG 11/22/22 - NSR, normal            ASSESSMENT AND PLAN:      Assessment and Plan:  1) Left arm squeezing in past   - comes on randomly - worse when she raises her arm to drive    - Stress Echo 4/6/21 - walked 2 min (4.6 METS). No CP or ischemic EKG changes. Normal stress echo but a poor workload. - Symptoms don't sound anginal in nature --- CV risk is low based on CAC score of 0 five years ago. Discussed options (nuclear study, CTA, CT heart scan) -- she decided to proceed with repeating a CT heart scan next --- if CAC score is still 0, then likelihood of severe CAD very low. - Arm pain is doing fine now after seeing Chiropractor      2) Brief afib per Holter report   - Dr. 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 Luis Angel Lee MD, [address]  [address] [facility], [address]      [place] [address]  [address]  [place], [facility] [address]                 [address]  Ph: [phone]                               Ph [phone]

## 2023-02-09 ENCOUNTER — HOSPITAL ENCOUNTER (OUTPATIENT)
Dept: ULTRASOUND IMAGING | Age: 63
Discharge: HOME | End: 2023-02-09
Payer: OTHER GOVERNMENT

## 2023-02-09 ENCOUNTER — OFFICE VISIT (OUTPATIENT)
Dept: CARDIOLOGY CLINIC | Age: 63
End: 2023-02-09
Payer: OTHER GOVERNMENT

## 2023-02-09 ENCOUNTER — TELEPHONE (OUTPATIENT)
Dept: CARDIOLOGY CLINIC | Age: 63
End: 2023-02-09

## 2023-02-09 VITALS
HEART RATE: 102 BPM | DIASTOLIC BLOOD PRESSURE: 84 MMHG | OXYGEN SATURATION: 97 % | WEIGHT: 181.2 LBS | BODY MASS INDEX: 32.11 KG/M2 | HEIGHT: 63 IN | SYSTOLIC BLOOD PRESSURE: 146 MMHG

## 2023-02-09 DIAGNOSIS — R00.2 PALPITATIONS: ICD-10-CM

## 2023-02-09 DIAGNOSIS — I10 HYPERTENSION, UNSPECIFIED TYPE: ICD-10-CM

## 2023-02-09 DIAGNOSIS — R06.02 SOB (SHORTNESS OF BREATH): ICD-10-CM

## 2023-02-09 DIAGNOSIS — M79.604 PAIN OF RIGHT LOWER EXTREMITY: ICD-10-CM

## 2023-02-09 DIAGNOSIS — R00.0 TACHYCARDIA: ICD-10-CM

## 2023-02-09 DIAGNOSIS — R00.0 TACHYCARDIA: Primary | ICD-10-CM

## 2023-02-09 PROCEDURE — 93970 EXTREMITY STUDY: CPT

## 2023-02-09 NOTE — TELEPHONE ENCOUNTER
Enrolled with [employer] - Ordered and being shipped to patient's home address on file. ETA within 5-7 business days. Monitor  Received: Today  Deric Kovacs, [place] - can you please send this patient a 7 day loop monitor for possible afib/dizziness. She is Dr. Madeleine Jama patient.      Thank you,     Donny James

## 2023-02-09 NOTE — LETTER
2/9/2023    Patient: Francesca Prince   YOB: 1960   Date of Visit: 2/9/2023     Vitaliy Bird MD  [address] [place] [id number]  Via Fax: [phone]    Dear Vitaliy Bird MD,      Thank you for referring Ms. Jossie Rocha to [address] for evaluation. My notes for this consultation are attached. If you have questions, please do not hesitate to call me. I look forward to following your patient along with you.       Sincerely,    MELLISA Chapa

## 2023-02-09 NOTE — PROGRESS NOTES
Chief Complaint   Patient presents with    Follow-up    Palpitations    Hypertension    Dizziness    Shortness of Breath     Vitals:    02/09/23 0914 02/09/23 0923   BP: (!) 156/88 (!) 146/84   BP 1 Location: Left upper arm Left upper arm   BP Patient Position: Sitting Sitting   Pulse: (!) 102    Height: 5' 3\" (1.6 m)    Weight: 181 lb 3.2 oz (82.2 kg)    SpO2: 97%      Chest pain denied   SOB denied   Palpitations yes  Swelling in hands/feet yes  Dizziness yes  Recent hospital stays denied   Refills denied     Since 23rd of jan multiple palps a day   174 with walking neighborhood. Told had afib years ago but haven't seen and since   Leg pain behind the knee  Flight for 2 hrs but triggered for 160s just seems to be all over the place.    Had lower lisinopril to 10 in dec d/t dizziness and has gone back to 15mg     Pain behind right knee- worried about clot

## 2023-02-09 NOTE — PROGRESS NOTES
Patient: Celina Grubbs  : 1960    Primary Cardiologist: Ish Sanford MD. [facility] [place]  Last Office Visit: 22      Today's Date: 2023      HISTORY OF PRESENT ILLNESS:     History of Present Illness:    Presents today for palpitations, shortness of breath, right leg pain.  started having worsening palps. Has had them multiple times per day, probably 6 times per day at least. Last a couple minutes maybe. Have happened in the middle of the night. She recently took a plane ride was on a plane and her her HR was 168 bpm per fit bit. Since the flight, she has had calf pain on the right side behind the knees, occurs with rest and activity. She walks frequently and has had HR as high as 150s-170s. Denies shortness of breath with palpitations, however has dyspnea on exertion. She feels winded going upstairs and is sometimes dizzy. Her BP has been slightly elevated as well SBP in 140s at home. Says she had her thyroid out in  and is seeing endocrinologist next week. Recent labs were reviewed and listed below. PAST MEDICAL HISTORY:     Past Medical History:   Diagnosis Date    Headache     Hypertension     Hypothyroidism     Insomnia     Melanoma ([address]     Mild cognitive impairment     Muscle pain          Past Surgical History:   Procedure Laterality Date    COLONOSCOPY N/A 6/10/2020    COLONOSCOPY performed by Karen Aleman MD at [facility] ENDOSCOPY    HX APPENDECTOMY      HX CHOLECYSTECTOMY      HX GYN      HX HEENT      HX OTHER SURGICAL      Total thyroidectomy         MEDICATIONS:     Current Outpatient Medications   Medication Sig Dispense Refill    vit C/E/Zn/coppr/lutein/zeaxan (PRESERVISION AREDS-2 PO) Take  by mouth.      lisinopriL (PRINIVIL, ZESTRIL) 20 mg tablet Take 1 Tablet by mouth daily. (Patient taking differently: Take 10 mg by mouth daily.) 30 Tablet 0    levothyroxine (SYNTHROID) 88 mcg tablet Take 100 mcg by mouth Daily (before breakfast). liothyronine (CYTOMEL) 5 mcg tablet Take 5 mcg by mouth. Takes 5mcg with synthroid, and in the afternoon takes 2.5mcg. furosemide (LASIX) 20 mg tablet Take 20 mg by mouth as needed. vitamin B complex (B COMPLEX 1 PO) Take  by mouth daily. cyclobenzaprine (FLEXERIL) 10 mg tablet daily as needed. 1    vitamin E (AQUA GEMS) 400 unit capsule Take  by mouth. 4 times a week      cholecalciferol, vitamin D3, 50 mcg (2,000 unit) tab Take  by mouth daily. selenium 100 mcg tab Take  by mouth daily. magnesium oxide (MAG-OX) 400 mg tablet Take 400 mg by mouth daily. krill-omega-3-dha-epa-lipids 134-12-07-60 mg cap Take  by mouth daily. Allergies   Allergen Reactions    Latex Rash    Ciprofloxacin Other (comments)     Interacts with thyroid medication    Contrast Agent [Iodine] Other (comments)     Causes high blood pressure and dizziness    Hydrochlorothiazide (Bulk) Other (comments)     Makes bp elevated & alters thyroid levels    Nitrofurantoin Monohyd/M-Cryst Nausea Only    Codeine Nausea Only             SOCIAL HISTORY:     Social History     Tobacco Use    Smoking status: Former     Packs/day: 0.50     Years: 10.00     Pack years: 5.00     Types: Cigarettes     Quit date: 6/10/1990     Years since quittin.6    Smokeless tobacco: Never    Tobacco comments:     quit in -only smoked for 4 years in college   Vaping Use    Vaping Use: Never used   Substance Use Topics    Alcohol use:  Yes     Alcohol/week: 0.0 standard drinks     Comment: 2x per year    Drug use: No         FAMILY HISTORY:     Family History   Problem Relation Age of Onset    Dementia Mother     Coronary Art Dis Father     Heart Attack Father     Cancer Father     Heart Disease Father     Stroke Father     Parkinson's Disease Maternal Aunt     Mental Retardation Sister     Breast Cancer Sister 64    Heart Disease Maternal Grandmother     Stroke Maternal Grandmother     Heart Disease Maternal Grandfather Heart Disease Paternal Grandmother     Stroke Paternal Grandmother     Heart Disease Paternal Grandfather     Headache Other     Breast Cancer Paternal Aunt           REVIEW OF SYMPTOMS:      Review of Symptoms:  Constitutional: Negative for fever, chills  HEENT: Negative for nosebleeds, tinnitus, and vision changes. Respiratory: Negative for cough, wheezing  Cardiovascular: Negative for orthopnea, claudication, syncope, and PND. Gastrointestinal: Negative for abdominal pain, diarrhea, melena. Genitourinary: Negative for dysuria  Musculoskeletal: Negative for myalgias. Skin: Negative for rash  Heme: No problems bleeding. Neurological: Negative for speech change and focal weakness. PHYSICAL EXAM:     Physical Exam:  Visit Vitals  LMP  (LMP Unknown)     Patient appears generally well, mood and affect are appropriate and pleasant. HEENT:  Hearing intact, non-icteric, normocephalic, atraumatic. Neck Exam: Supple, No JVD   Lung Exam: Clear to auscultation, even breath sounds. Cardiac Exam: Regular rate and rhythm with no murmur or rub  Abdomen: Soft, non-tender  Extremities: Moves all ext well. No lower extremity edema. MSKTL: Overall good ROM ext  Skin: No significant rashes  Psych: Appropriate affect  Neuro - Grossly intact             LABS / OTHER STUDIES:      Labs 5/15 - chol 191, TG 74, HDL 72,   Labs 12/22/15 - CBC OK, TSH 0.04  Labs 9/18 -CBC and CMP OK, chol 200, TG 62, , HDL 71  Labs 12/18 - CMP OK,   Labs 2/17 - TSH 0.007, T3 226,   Labs 6/20 - BMP normal      1/31/23 - T4 1.50, TSH 0.721, T3 119     CARDIAC DIAGNOSTICS:      Cardiac Evaluation Includes:  Stress echo 2/6/12 - patient walked 5 min; normal stress EKG and echo. Resting echo images showed mild mitral regurgitation. Holter 2/8/12 - NSR with HR ; one hour of frequent PAC's, one 6 beat run of ectopic atrial tach, 2 PVC's, Diary entries (CP, fast HR) correlate with NSR rate .      Stress Echo 2/15/16 - walked 2:13 (4.6 METS). No CP. Normal stress EKG and stress echo. Extra images show normal heart anatomy and normal PA pressures. CT Heart Scan 2/18/16 - CAC Score 0      Carotid Doppler 1/16/17 - 1-15% stenosis bilat      Echo 8/17 () - LVEF 60%     Holter 8/17 - NSR, rare PVC's, 2 brief runs of afib (per report)     Stress Echo 4/6/21 - walked 2 min (4.6 METS). No CP or ischemic EKG changes. Normal stress echo. She says exercise was limited since tough walking with a mask on.          EKG 2/6/12 - NSR, normal  EKG 2/8/16 - NSR, normal   EKG 3/18/19 - NSR, normal    EKG 3/17/21 - NSR, normal   EKG 11/22/22 - NSR, normal   EKG 2/9/23 - NSR           ASSESSMENT AND PLAN:      Assessment and Plan:  1) Palpitations  - has history of this, but has increased in frequency in the last few weeks --> says they occur everyday, every few hours  - has readings of HR as high as 160s-170s  - will check 7 day monitor   - patient would like to hold off on BB at this time - discussed she can notify office with worsening symptoms  - will check echo, cbc, bmp  - had her thyroid removed in 1995 - thyroid labs reviewed from 1/31/23 and normal - she follows with endocrinology next week     2) Brief afib per Holter report   - Dr. Cadet Na noted some transient afib on 8/17 Holter  - Her [id number] score is borderline 2   - was never on a blood thinner and no more afib otherwise   - will follow and consider [address] if any more Afib    - Some palpitations but no heart racing spells   - She has a Kardia[employer] device to check rhythm at home   - with increased palps since 1/23/23 and heart racing - feels a flutter - checking 7 day monitor  - discussed symptoms suspicious for afib, however, she wants to wait for results to start [address]   - discussed if afib we would start [address]   - will check echo     3) HTN   - BP elevated at home SBP 140s  - she has been taking  lisinopril 15 mg, asked her to increase to 20 mg     4) calf pain, right  - on exam is not increasingly swollen, red - however, with symptoms and recent flight will check venous duplex, d-dimer     5) shortness of breath, LIMON  - exertional, not necessarily associated with palpitations  - will check d-dimer, cbc, bmp, echo    She will follow-up with me in March 2023. 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 Iwona Potter, THU    [address], [address]  [place] [address]  [address]  [place], [facility] [address]  [facility], [address] [place] [address]  Ph: [phone]   Ph [phone]

## 2023-02-10 ENCOUNTER — ANCILLARY PROCEDURE (OUTPATIENT)
Dept: CARDIOLOGY CLINIC | Age: 63
End: 2023-02-10
Payer: OTHER GOVERNMENT

## 2023-02-10 VITALS
DIASTOLIC BLOOD PRESSURE: 82 MMHG | BODY MASS INDEX: 32.07 KG/M2 | HEIGHT: 63 IN | WEIGHT: 181 LBS | SYSTOLIC BLOOD PRESSURE: 136 MMHG

## 2023-02-10 DIAGNOSIS — I10 HYPERTENSION, UNSPECIFIED TYPE: ICD-10-CM

## 2023-02-10 DIAGNOSIS — R06.02 SOB (SHORTNESS OF BREATH): ICD-10-CM

## 2023-02-10 DIAGNOSIS — R00.2 PALPITATIONS: ICD-10-CM

## 2023-02-10 DIAGNOSIS — M79.604 PAIN OF RIGHT LOWER EXTREMITY: ICD-10-CM

## 2023-02-10 DIAGNOSIS — R00.0 TACHYCARDIA: ICD-10-CM

## 2023-02-10 LAB
BUN SERPL-MCNC: 16 MG/DL (ref 8–27)
BUN/CREAT SERPL: 25 (ref 12–28)
CALCIUM SERPL-MCNC: 9.8 MG/DL (ref 8.7–10.3)
CHLORIDE SERPL-SCNC: 98 MMOL/L (ref 96–106)
CO2 SERPL-SCNC: 24 MMOL/L (ref 20–29)
CREAT SERPL-MCNC: 0.64 MG/DL (ref 0.57–1)
D DIMER PPP FEU-MCNC: <0.2 MG/L FEU (ref 0–0.49)
ECHO AO ROOT DIAM: 3 CM
ECHO AO ROOT INDEX: 1.62 CM/M2
ECHO AV MEAN GRADIENT: 6 MMHG
ECHO AV MEAN VELOCITY: 1.2 M/S
ECHO AV PEAK GRADIENT: 12 MMHG
ECHO AV PEAK VELOCITY: 1.7 M/S
ECHO AV VELOCITY RATIO: 0.76
ECHO AV VTI: 32.4 CM
ECHO LA DIAMETER INDEX: 1.51 CM/M2
ECHO LA DIAMETER: 2.8 CM
ECHO LA TO AORTIC ROOT RATIO: 0.93
ECHO LA VOL 2C: 47 ML (ref 22–52)
ECHO LA VOL 4C: 36 ML (ref 22–52)
ECHO LA VOL BP: 45 ML (ref 22–52)
ECHO LA VOL/BSA BIPLANE: 24 ML/M2 (ref 16–34)
ECHO LA VOLUME AREA LENGTH: 48 ML
ECHO LA VOLUME INDEX A2C: 25 ML/M2 (ref 16–34)
ECHO LA VOLUME INDEX A4C: 19 ML/M2 (ref 16–34)
ECHO LA VOLUME INDEX AREA LENGTH: 26 ML/M2 (ref 16–34)
ECHO LV E' LATERAL VELOCITY: 9 CM/S
ECHO LV E' SEPTAL VELOCITY: 8 CM/S
ECHO LV EDV A2C: 57 ML
ECHO LV EDV A4C: 70 ML
ECHO LV EDV BP: 65 ML (ref 56–104)
ECHO LV EDV INDEX A4C: 38 ML/M2
ECHO LV EDV INDEX BP: 35 ML/M2
ECHO LV EDV NDEX A2C: 31 ML/M2
ECHO LV EJECTION FRACTION A2C: 62 %
ECHO LV EJECTION FRACTION A4C: 63 %
ECHO LV EJECTION FRACTION BIPLANE: 64 % (ref 55–100)
ECHO LV ESV A2C: 21 ML
ECHO LV ESV A4C: 26 ML
ECHO LV ESV BP: 24 ML (ref 19–49)
ECHO LV ESV INDEX A2C: 11 ML/M2
ECHO LV ESV INDEX A4C: 14 ML/M2
ECHO LV ESV INDEX BP: 13 ML/M2
ECHO LV FRACTIONAL SHORTENING: 32 % (ref 28–44)
ECHO LV INTERNAL DIMENSION DIASTOLE INDEX: 2.38 CM/M2
ECHO LV INTERNAL DIMENSION DIASTOLIC: 4.4 CM (ref 3.9–5.3)
ECHO LV INTERNAL DIMENSION SYSTOLIC INDEX: 1.62 CM/M2
ECHO LV INTERNAL DIMENSION SYSTOLIC: 3 CM
ECHO LV IVSD: 0.9 CM (ref 0.6–0.9)
ECHO LV MASS 2D: 128 G (ref 67–162)
ECHO LV MASS INDEX 2D: 69.2 G/M2 (ref 43–95)
ECHO LV POSTERIOR WALL DIASTOLIC: 0.9 CM (ref 0.6–0.9)
ECHO LV RELATIVE WALL THICKNESS RATIO: 0.41
ECHO LVOT AV VTI INDEX: 0.78
ECHO LVOT MEAN GRADIENT: 3 MMHG
ECHO LVOT PEAK GRADIENT: 6 MMHG
ECHO LVOT PEAK VELOCITY: 1.3 M/S
ECHO LVOT VTI: 25.2 CM
ECHO MV A VELOCITY: 1.13 M/S
ECHO MV AREA PHT: 5.2 CM2
ECHO MV E DECELERATION TIME (DT): 145.4 MS
ECHO MV E VELOCITY: 0.96 M/S
ECHO MV E/A RATIO: 0.85
ECHO MV E/E' LATERAL: 10.67
ECHO MV E/E' RATIO (AVERAGED): 11.33
ECHO MV E/E' SEPTAL: 12
ECHO MV PRESSURE HALF TIME (PHT): 42.2 MS
ECHO RV FREE WALL PEAK S': 16 CM/S
ECHO RV INTERNAL DIMENSION: 3.4 CM
ECHO RV TAPSE: 2.3 CM (ref 1.7–?)
EGFRCR SERPLBLD CKD-EPI 2021: 100 ML/MIN/1.73
ERYTHROCYTE [DISTWIDTH] IN BLOOD BY AUTOMATED COUNT: 13.3 % (ref 11.7–15.4)
GLUCOSE SERPL-MCNC: 87 MG/DL (ref 70–99)
HCT VFR BLD AUTO: 42.9 % (ref 34–46.6)
HGB BLD-MCNC: 14 G/DL (ref 11.1–15.9)
MCH RBC QN AUTO: 27.5 PG (ref 26.6–33)
MCHC RBC AUTO-ENTMCNC: 32.6 G/DL (ref 31.5–35.7)
MCV RBC AUTO: 84 FL (ref 79–97)
PLATELET # BLD AUTO: 301 X10E3/UL (ref 150–450)
POTASSIUM SERPL-SCNC: 4.5 MMOL/L (ref 3.5–5.2)
RBC # BLD AUTO: 5.1 X10E6/UL (ref 3.77–5.28)
SODIUM SERPL-SCNC: 137 MMOL/L (ref 134–144)
WBC # BLD AUTO: 9.7 X10E3/UL (ref 3.4–10.8)

## 2023-03-13 NOTE — PROGRESS NOTES
Patient: Jocy Rabago  : 1960    Primary Cardiologist: Gela Gamble MD. [facility] [place]  Last Office Visit: 22      Today's Date: 3/14/2023      HISTORY OF PRESENT ILLNESS:     History of Present Illness: Today she presents for follow-up of test results. Took BP this AM prior to visit and BP was 127/80. She threw her back out after this and is in pain so BP is elevated in office today. She is going home to relax after this and will continue to monitor BP at home. She has seen endocrine since last visit and they were planning to make adjustments to her medications since her thyroid was removed. She takes lasix as needed and does not need to take it often. I had previously increased her lisinopril to 20 mg daily and she had SBP readings as low as 98. So she adjusted back to 15 mg daily. She has a rhythm strip from her iphone that says possible afib, however, is NSR. Denies chest pain, edema, syncope, shortness of breath at rest,   She does have LIMON when climbing stairs occasionally, is not constant. Also, continues with infrequent palpitations. Reviewed monitor results with Dr. Larisa Parker and discussed with patient. From previous OV \"Presents today for palpitations, shortness of breath, right leg pain.  started having worsening palps. Has had them multiple times per day, probably 6 times per day at least. Last a couple minutes maybe. Have happened in the middle of the night. She recently took a plane ride was on a plane and her her HR was 168 bpm per fit bit. Since the flight, she has had calf pain on the right side behind the knees, occurs with rest and activity. She walks frequently and has had HR as high as 150s-170s. Denies shortness of breath with palpitations, however has dyspnea on exertion. She feels winded going upstairs and is sometimes dizzy. Her BP has been slightly elevated as well SBP in 140s at home.   Says she had her thyroid out in  and is seeing endocrinologist next week. Recent labs were reviewed and listed below. \"    PAST MEDICAL HISTORY:     Past Medical History:   Diagnosis Date    Headache     Hypertension     Hypothyroidism     Insomnia     Melanoma ([address]     Mild cognitive impairment     Muscle pain          Past Surgical History:   Procedure Laterality Date    COLONOSCOPY N/A 6/10/2020    COLONOSCOPY performed by Louie Moreno MD at [facility] ENDOSCOPY    HX APPENDECTOMY      HX CHOLECYSTECTOMY      HX GYN      HX HEENT      HX OTHER SURGICAL  1995    Total thyroidectomy         MEDICATIONS:     Current Outpatient Medications   Medication Sig Dispense Refill    vit C/E/Zn/coppr/lutein/zeaxan (PRESERVISION AREDS-2 PO) Take  by mouth.      lisinopriL (PRINIVIL, ZESTRIL) 20 mg tablet Take 1 Tablet by mouth daily. (Patient taking differently: Take 10 mg by mouth daily. Taking 10-15 mg PRN r/t BP) 30 Tablet 0    levothyroxine (SYNTHROID) 100 mcg tablet Take 100 mcg by mouth Daily (before breakfast). liothyronine (CYTOMEL) 5 mcg tablet Take 5 mcg by mouth. Takes 5mcg with synthroid, and in the afternoon takes 2.5mcg (4 days 5 mcg in pm). furosemide (LASIX) 20 mg tablet Take 20 mg by mouth as needed. vitamin B complex (B COMPLEX 1 PO) Take  by mouth daily. cyclobenzaprine (FLEXERIL) 10 mg tablet daily as needed. 1    cholecalciferol (VITAMIN D3) (1000 Units /25 mcg) tablet Take  by mouth daily. selenium 100 mcg tab Take  by mouth daily. magnesium oxide (MAG-OX) 400 mg tablet Take 200 mg by mouth daily. krill-omega-3-dha-epa-lipids 410-50-61-32 mg cap Take  by mouth daily.            Allergies   Allergen Reactions    Latex Rash    Ciprofloxacin Other (comments)     Interacts with thyroid medication    Contrast Agent [Iodine] Other (comments)     Causes high blood pressure and dizziness    Hydrochlorothiazide (Bulk) Other (comments)     Makes bp elevated & alters thyroid levels    Nitrofurantoin Monohyd/M-Cryst Nausea Only Codeine Nausea Only             SOCIAL HISTORY:     Social History     Tobacco Use    Smoking status: Former     Packs/day: 0.50     Years: 10.00     Pack years: 5.00     Types: Cigarettes     Quit date: 6/10/1990     Years since quittin.7    Smokeless tobacco: Never    Tobacco comments:     quit in -only smoked for 4 years in college   Vaping Use    Vaping Use: Never used   Substance Use Topics    Alcohol use: Yes     Alcohol/week: 0.0 standard drinks     Comment: 2x per year    Drug use: No         FAMILY HISTORY:     Family History   Problem Relation Age of Onset    Dementia Mother     Coronary Art Dis Father     Heart Attack Father     Cancer Father     Heart Disease Father     Stroke Father     Parkinson's Disease Maternal Aunt     Mental Retardation Sister     Breast Cancer Sister 64    Heart Disease Maternal Grandmother     Stroke Maternal Grandmother     Heart Disease Maternal Grandfather     Heart Disease Paternal Grandmother     Stroke Paternal Grandmother     Heart Disease Paternal Grandfather     Headache Other     Breast Cancer Paternal Aunt           REVIEW OF SYMPTOMS:      Review of Symptoms:  Constitutional: Negative for fever, chills  HEENT: Negative for nosebleeds, tinnitus, and vision changes. Respiratory: Negative for cough, wheezing  Cardiovascular: Negative for orthopnea, claudication, syncope, and PND. Gastrointestinal: Negative for abdominal pain, diarrhea, melena. Genitourinary: Negative for dysuria  Musculoskeletal: Negative for myalgias. Skin: Negative for rash  Heme: No problems bleeding. Neurological: Negative for speech change and focal weakness.                     PHYSICAL EXAM:     Physical Exam:  Visit Vitals  BP (!) 190/84 (BP 1 Location: Left upper arm, BP Patient Position: Sitting, BP Cuff Size: Adult)   Pulse 98   Ht 5' 3\" (1.6 m)   Wt 178 lb (80.7 kg)   LMP  (LMP Unknown)   SpO2 97%   BMI 31.53 kg/m²     Patient appears generally well, mood and affect are appropriate and pleasant. HEENT:  Hearing intact, non-icteric, normocephalic, atraumatic. Neck Exam: Supple, No JVD   Lung Exam: Clear to auscultation, even breath sounds. Cardiac Exam: Regular rate and rhythm with no murmur or rub  Abdomen: Soft, non-tender  Extremities: Moves all ext well. No lower extremity edema. MSKTL: Overall good ROM ext  Skin: No significant rashes  Psych: Appropriate affect  Neuro - Grossly intact             LABS / OTHER STUDIES:      Labs 5/15 - chol 191, TG 74, HDL 72,   Labs 12/22/15 - CBC OK, TSH 0.04  Labs 9/18 -CBC and CMP OK, chol 200, TG 62, , HDL 71  Labs 12/18 - CMP OK,   Labs 2/17 - TSH 0.007, T3 226,   Labs 6/20 - BMP normal      1/31/23 - T4 1.50, TSH 0.721, T3 119     CARDIAC DIAGNOSTICS:      Cardiac Evaluation Includes:  Stress echo 2/6/12 - patient walked 5 min; normal stress EKG and echo. Resting echo images showed mild mitral regurgitation. Holter 2/8/12 - NSR with HR ; one hour of frequent PAC's, one 6 beat run of ectopic atrial tach, 2 PVC's, Diary entries (CP, fast HR) correlate with NSR rate . Stress Echo 2/15/16 - walked 2:13 (4.6 METS). No CP. Normal stress EKG and stress echo. Extra images show normal heart anatomy and normal PA pressures. CT Heart Scan 2/18/16 - CAC Score 0      Carotid Doppler 1/16/17 - 1-15% stenosis bilat      Echo 8/17 () - LVEF 60%     Holter 8/17 - NSR, rare PVC's, 2 brief runs of afib (per report)     Stress Echo 4/6/21 - walked 2 min (4.6 METS). No CP or ischemic EKG changes. Normal stress echo. She says exercise was limited since tough walking with a mask on.          EKG 2/6/12 - NSR, normal  EKG 2/8/16 - NSR, normal   EKG 3/18/19 - NSR, normal    EKG 3/17/21 - NSR, normal   EKG 11/22/22 - NSR, normal   EKG 2/9/23 - NSR           ASSESSMENT AND PLAN:      Assessment and Plan:  1) Palpitations  - has history of this, but has increased in frequency in the last few weeks --> says they occur everyday, every few hours  - has readings of HR as high as 160s-170s  - had her thyroid removed in 1995 - thyroid labs reviewed from 1/31/23 and normal - she follows with endocrinology on May 5th - plans to have her back off some of the medications since cardiac workup negative   - labs, echo normal. 7 day monitor with one episode PSVT, discussed with Dr. Trisha Tuttle - pt prefers no medication at this time due to infrequent symptoms       2) Brief afib per Holter report   - Dr. Cristal Martinez noted some transient afib on 8/17 Holter  - Her [id number] score is borderline 2   - was never on a blood thinner and no more afib otherwise   - will follow and consider [address] if any more Afib    - Some palpitations but no heart racing spells   - She has a K[employer] device to check rhythm at home   - with increased palps since 1/23/23 and heart racing - feels a flutter -   - 7 day monitor with one episode PSVT - reviewed with Dr. Trisha Tuttle     3) HTN   - BP elevated at home SBP 140s  - she has been taking  lisinopril 15 mg, asked her to increase to 20 mg   - when she increased to 20 mg daily she had SBP readings 98 - now taking 10-15 mg daily    4) calf pain, right  - on exam is not increasingly swollen, red - however, with symptoms and recent flight will check venous duplex, d-dimer --all were negative    5) shortness of breath, LIMON  - exertional, not necessarily associated with palpitations  - recent labs, echo normal   - has improved. She will see Dr. Lenore Marquez as scheduled in November 2023. She will call office with additional concerns. 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 Kevin Rae NP    [address], [address]  [facility]  [address]  [place] [address] [place] [address], [address] [place] [address]  Ph: [phone]   Ph [phone]

## 2023-03-14 ENCOUNTER — OFFICE VISIT (OUTPATIENT)
Dept: CARDIOLOGY CLINIC | Age: 63
End: 2023-03-14
Payer: OTHER GOVERNMENT

## 2023-03-14 VITALS
WEIGHT: 178 LBS | SYSTOLIC BLOOD PRESSURE: 190 MMHG | BODY MASS INDEX: 31.54 KG/M2 | HEART RATE: 98 BPM | HEIGHT: 63 IN | OXYGEN SATURATION: 97 % | DIASTOLIC BLOOD PRESSURE: 84 MMHG

## 2023-03-14 DIAGNOSIS — I10 HYPERTENSION, UNSPECIFIED TYPE: ICD-10-CM

## 2023-03-14 DIAGNOSIS — R00.0 TACHYCARDIA: Primary | ICD-10-CM

## 2023-03-14 DIAGNOSIS — R00.2 PALPITATIONS: ICD-10-CM

## 2023-03-14 DIAGNOSIS — Z82.49 FAMILY HISTORY OF PREMATURE CAD: ICD-10-CM

## 2023-03-14 PROCEDURE — 3077F SYST BP >= 140 MM HG: CPT

## 2023-03-14 PROCEDURE — 93000 ELECTROCARDIOGRAM COMPLETE: CPT

## 2023-03-14 PROCEDURE — 3079F DIAST BP 80-89 MM HG: CPT

## 2023-03-14 PROCEDURE — 99214 OFFICE O/P EST MOD 30 MIN: CPT

## 2023-03-14 NOTE — LETTER
3/14/2023    Patient: Teddy Nguyen   YOB: 1960   Date of Visit: 3/14/2023     Misty Garcia MD  [address]  [place] [phone]  Via Fax: [phone]    Dear Misty Garcia MD,      Thank you for referring Ms. Christian Agustin to [address] [place] [address] for evaluation. My notes for this consultation are attached. If you have questions, please do not hesitate to call me. I look forward to following your patient along with you.       Sincerely,    Carter Hopkins, MELLISA

## 2023-03-14 NOTE — PROGRESS NOTES
Chief Complaint   Patient presents with    Follow-up     1 month; Yasmin Schneider pt    Hypertension    Shortness of Breath    Palpitations     Vitals:    03/14/23 0908 03/14/23 0919   BP: (!) 178/82 (!) 190/84   BP 1 Location: Left upper arm Left upper arm   BP Patient Position: Sitting Sitting   BP Cuff Size: Adult Adult   Pulse: 98    Height: 5' 3\" (1.6 m)    Weight: 178 lb (80.7 kg)    SpO2: 97%      Chest pain denied   SOB denied   Palpitations denied   Swelling in hands/feet denied   Dizziness denied   Recent hospital stays denied   Refills denied     Home SBP 's. Takes 10-15 mg depending on BP readings. CIBDO shows 98 possible afib.     This am took Lisinopril 15 mg

## 2023-11-28 ENCOUNTER — OFFICE VISIT (OUTPATIENT)
Age: 63
End: 2023-11-28
Payer: OTHER GOVERNMENT

## 2023-11-28 VITALS
SYSTOLIC BLOOD PRESSURE: 136 MMHG | DIASTOLIC BLOOD PRESSURE: 80 MMHG | OXYGEN SATURATION: 97 % | HEART RATE: 84 BPM | WEIGHT: 163 LBS | BODY MASS INDEX: 28.88 KG/M2 | HEIGHT: 63 IN

## 2023-11-28 DIAGNOSIS — I10 ESSENTIAL (PRIMARY) HYPERTENSION: Primary | ICD-10-CM

## 2023-11-28 DIAGNOSIS — R00.2 PALPITATIONS: ICD-10-CM

## 2023-11-28 PROCEDURE — 3079F DIAST BP 80-89 MM HG: CPT | Performed by: SPECIALIST

## 2023-11-28 PROCEDURE — 99214 OFFICE O/P EST MOD 30 MIN: CPT | Performed by: SPECIALIST

## 2023-11-28 PROCEDURE — 3075F SYST BP GE 130 - 139MM HG: CPT | Performed by: SPECIALIST

## 2023-11-28 NOTE — PROGRESS NOTES
Room # 4  Chief Complaint   Patient presents with    Follow-up     8 months    Hypertension    Palpitations    Tachycardia     Vitals:    11/28/23 1354   BP: 136/80   Site: Left Upper Arm   Position: Sitting   Cuff Size: Medium Adult   Pulse: 84   SpO2: 97%   Weight: 73.9 kg (163 lb)   Height: 1.6 m (5' 3\")     Chest pain denied   SOB denied   Palpitations denied   Swelling in hands/feet denied   Dizziness denied   Recent hospital stays denied   Refills denied     Clarinda Boas, had monitor, showed irregular arrhythmia. Adjusted thyroid medication, then was regular after that.

## 2023-11-28 NOTE — PROGRESS NOTES
Fani Arcos MD. [facility] [place]          Patient: Linn Payton  : 1960      Today's Date: 2023        HISTORY OF PRESENT ILLNESS:     History of Present Illness:    NP Noted in 3/23 -   \"Today she presents for follow-up of test results. Took BP this AM prior to visit and BP was 127/80. She threw her back out after this and is in pain so BP is elevated in office today. She is going home to relax after this and will continue to monitor  BP at home. She has seen endocrine since last visit and they were planning to make adjustments to her medications since her thyroid was removed. She takes lasix as needed and does not need to take it often. I had previously increased her lisinopril to  20 mg daily and she had SBP readings as low as 98. So she adjusted back to 15 mg daily. She has a rhythm strip from her iphone that says possible afib, however, is NSR. Denies chest pain, edema, syncope, shortness of breath at rest,    She does have PATEL when climbing stairs occasionally, is not constant. Also, continues with infrequent palpitations. \"      On  - she says she is doing great.         PAST MEDICAL HISTORY:     Past Medical History:   Diagnosis Date    Headache     Hypertension     Hypothyroidism     Insomnia     Melanoma ([address])     Mild cognitive impairment     Muscle pain        Past Surgical History:   Procedure Laterality Date    APPENDECTOMY      CHOLECYSTECTOMY      COLONOSCOPY N/A 6/10/2020    COLONOSCOPY performed by Jr Tiwari MD at [address] [place] [address]    Total thyroidectomy             CURRENT MEDICATIONS:    .  Current Outpatient Medications   Medication Sig Dispense Refill    vitamin D (CHOLECALCIFEROL) 25 MCG (1000 UT) TABS tablet Take by mouth daily      cyclobenzaprine (FLEXERIL) 10 MG tablet daily as needed      furosemide (LASIX) 20 MG tablet Take 1 tablet by mouth as needed      levothyroxine (SYNTHROID) 100 MCG tablet Take 1 tablet by

## 2024-02-05 ENCOUNTER — TELEPHONE (OUTPATIENT)
Age: 64
End: 2024-02-05

## 2024-02-06 NOTE — TELEPHONE ENCOUNTER
Fax received from [facility] requesting \"OV for lightheadedness and fainting spells.  She may benefit from another event monitor.\"    Records left at [facility] for appointment   Future Appointments   Date Time Provider Department Center   2/8/2024 10:20 AM Katherine Evangelista APRN - [facility]   12/3/2024 10:00 AM Selam Hopson MD [facility]

## 2024-02-07 NOTE — PROGRESS NOTES
Patient: Marjorie Bryan  : 1960    Primary Cardiologist: Selam Hopson MD. [facility]  Last Office Visit: 23    Today's Date: 2024      HISTORY OF PRESENT ILLNESS:     History of Present Illness:  Presents today for concerns with dizziness, palpitations, shortness of breath. Walking around the store, feels like she is going to pass out. 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 She has never passed out. Called PCP with complaints of right side pain, pressure in center of chest. PCP examines and she has pain in the RLQ - performed ultrasound that revealed main bile duct was dilated. Has \"major indigestion\". With dizziness, feels like heart is racing.   BP at home mostly 120s-130s, very elevated today. Denies headaches. Feels shortness of breath with climbing the stairs, walking around the neighborhood.     NP Noted in 3/23 -   \"Today she presents for follow-up of test results. Took BP this AM prior to visit and BP was 127/80. She threw her back out after this and is in pain so BP is elevated in office today. She is going home to relax after this and will continue to monitor  BP at home. She has seen endocrine since last visit and they were planning to make adjustments to her medications since her thyroid was removed. She takes lasix as needed and does not need to take it often. I had previously increased her lisinopril to  20 mg daily and she had SBP readings as low as 98. So she adjusted back to 15 mg daily. She has a rhythm strip from her iphone that says possible afib, however, is NSR. Denies chest pain, edema, syncope, shortness of breath at rest,    She does have PATEL when climbing stairs occasionally, is not constant. Also, continues with infrequent palpitations.\"      PAST MEDICAL HISTORY:     Past Medical History:   Diagnosis Date    Headache     Hypertension     Hypothyroidism     Insomnia     Melanoma (HCC)     Mild cognitive

## 2024-02-08 ENCOUNTER — TELEPHONE (OUTPATIENT)
Age: 64
End: 2024-02-08

## 2024-02-08 ENCOUNTER — ANCILLARY PROCEDURE (OUTPATIENT)
Age: 64
End: 2024-02-08
Payer: OTHER GOVERNMENT

## 2024-02-08 ENCOUNTER — OFFICE VISIT (OUTPATIENT)
Age: 64
End: 2024-02-08
Payer: OTHER GOVERNMENT

## 2024-02-08 VITALS
SYSTOLIC BLOOD PRESSURE: 190 MMHG | OXYGEN SATURATION: 99 % | HEART RATE: 123 BPM | WEIGHT: 163.6 LBS | HEIGHT: 63 IN | BODY MASS INDEX: 28.99 KG/M2 | DIASTOLIC BLOOD PRESSURE: 110 MMHG

## 2024-02-08 DIAGNOSIS — R42 DIZZINESS: ICD-10-CM

## 2024-02-08 DIAGNOSIS — R00.2 PALPITATIONS: ICD-10-CM

## 2024-02-08 DIAGNOSIS — R07.89 CHEST PRESSURE: ICD-10-CM

## 2024-02-08 DIAGNOSIS — I10 ESSENTIAL (PRIMARY) HYPERTENSION: Primary | ICD-10-CM

## 2024-02-08 DIAGNOSIS — R00.2 PALPITATION: ICD-10-CM

## 2024-02-08 PROCEDURE — 99215 OFFICE O/P EST HI 40 MIN: CPT

## 2024-02-08 PROCEDURE — 3077F SYST BP >= 140 MM HG: CPT

## 2024-02-08 PROCEDURE — 93010 ELECTROCARDIOGRAM REPORT: CPT

## 2024-02-08 PROCEDURE — 93005 ELECTROCARDIOGRAM TRACING: CPT

## 2024-02-08 PROCEDURE — 3080F DIAST BP >= 90 MM HG: CPT

## 2024-02-08 PROCEDURE — 93229 REMOTE 30 DAY ECG TECH SUPP: CPT | Performed by: SPECIALIST

## 2024-02-08 RX ORDER — CYANOCOBALAMIN (VITAMIN B-12) 500 MCG
TABLET ORAL DAILY
COMMUNITY

## 2024-02-08 NOTE — PROGRESS NOTES
Chief Complaint   Patient presents with    Hypertension    Palpitations     Vitals:    02/08/24 1010   Height: 1.6 m (5' 3\")     Chest pain: DENIED     Recent hospital stays: DENIED     Refills: DENIED     LIGHTHEADEDNESS, DIZZY, SOB, RIGHT SIDE PAIN, YELLOW BOWELS, BURNING CHEST PAIN - CURRENTLY

## 2024-02-08 NOTE — TELEPHONE ENCOUNTER
Enrolled with [employer] - Ordered and being shipped to patient's home address on file.  ETA within 5-7 business days.    Message  Received: Today  Katherine Evangelista, APRN - NP  Francheska Andrews can you please send patient a 14 day loop monitor for dizziness, palpitations.  Thank you!  Katherine

## 2024-02-09 ENCOUNTER — ANCILLARY PROCEDURE (OUTPATIENT)
Age: 64
End: 2024-02-09
Payer: OTHER GOVERNMENT

## 2024-02-09 VITALS
BODY MASS INDEX: 28.88 KG/M2 | HEIGHT: 63 IN | WEIGHT: 163 LBS | HEART RATE: 86 BPM | DIASTOLIC BLOOD PRESSURE: 88 MMHG | SYSTOLIC BLOOD PRESSURE: 146 MMHG

## 2024-02-09 DIAGNOSIS — R07.9 CHEST PAIN: ICD-10-CM

## 2024-02-09 DIAGNOSIS — R42 DIZZY: ICD-10-CM

## 2024-02-09 DIAGNOSIS — R00.2 PALPITATIONS: ICD-10-CM

## 2024-02-09 DIAGNOSIS — R06.02 SOB (SHORTNESS OF BREATH): ICD-10-CM

## 2024-02-09 PROCEDURE — A9500 TC99M SESTAMIBI: HCPCS | Performed by: SPECIALIST

## 2024-02-09 PROCEDURE — 78452 HT MUSCLE IMAGE SPECT MULT: CPT | Performed by: SPECIALIST

## 2024-02-09 RX ORDER — TETRAKIS(2-METHOXYISOBUTYLISOCYANIDE)COPPER(I) TETRAFLUOROBORATE 1 MG/ML
8.1 INJECTION, POWDER, LYOPHILIZED, FOR SOLUTION INTRAVENOUS
Status: COMPLETED | OUTPATIENT
Start: 2024-02-09 | End: 2024-02-09

## 2024-02-09 RX ORDER — TETRAKIS(2-METHOXYISOBUTYLISOCYANIDE)COPPER(I) TETRAFLUOROBORATE 1 MG/ML
24.1 INJECTION, POWDER, LYOPHILIZED, FOR SOLUTION INTRAVENOUS
Status: COMPLETED | OUTPATIENT
Start: 2024-02-09 | End: 2024-02-09

## 2024-02-09 RX ADMIN — TECHNETIUM TC-99M SESTAMIBI 8.1 MILLICURIE: 1 INJECTION INTRAVENOUS at 12:55

## 2024-02-09 RX ADMIN — TECHNETIUM TC-99M SESTAMIBI 24.1 MILLICURIE: 1 INJECTION INTRAVENOUS at 13:45

## 2024-02-12 ENCOUNTER — CLINICAL DOCUMENTATION (OUTPATIENT)
Age: 64
End: 2024-02-12

## 2024-02-12 LAB
ECHO BSA: 1.81 M2
NUC STRESS EJECTION FRACTION: 78 %
STRESS ANGINA INDEX: 0
STRESS BASELINE DIAS BP: 88 MMHG
STRESS BASELINE HR: 89 BPM
STRESS BASELINE SYS BP: 146 MMHG
STRESS ESTIMATED WORKLOAD: 4.6 METS
STRESS EXERCISE DUR MIN: 3 MIN
STRESS EXERCISE DUR SEC: 17 SEC
STRESS O2 SAT PEAK: 99 %
STRESS O2 SAT REST: 98 %
STRESS PEAK DIAS BP: 92 MMHG
STRESS PEAK SYS BP: 170 MMHG
STRESS PERCENT HR ACHIEVED: 100 %
STRESS POST PEAK HR: 157 BPM
STRESS RATE PRESSURE PRODUCT: NORMAL BPM*MMHG
STRESS TARGET HR: 157 BPM
TID: 1.09

## 2024-02-12 NOTE — PROGRESS NOTES
Called Dr. Quispe's office to obtain fax number. Verified number [phone]. Attempted multiple unsuccessful times to fax. Mailed nuclear report.

## 2024-02-12 NOTE — RESULT ENCOUNTER NOTE
Dear MsFarzana Irvin,  Good News!  Your stress test results are normal.   Please let me know if you have questions.  Best Regards,  BRANDO Guadalupe NP

## 2024-02-15 ENCOUNTER — TELEPHONE (OUTPATIENT)
Age: 64
End: 2024-02-15

## 2024-02-15 ENCOUNTER — OFFICE VISIT (OUTPATIENT)
Age: 64
End: 2024-02-15

## 2024-02-15 VITALS
SYSTOLIC BLOOD PRESSURE: 158 MMHG | HEIGHT: 63 IN | HEART RATE: 74 BPM | BODY MASS INDEX: 28.88 KG/M2 | OXYGEN SATURATION: 96 % | WEIGHT: 163 LBS | DIASTOLIC BLOOD PRESSURE: 86 MMHG

## 2024-02-15 DIAGNOSIS — I10 ESSENTIAL (PRIMARY) HYPERTENSION: Primary | ICD-10-CM

## 2024-02-15 NOTE — PROGRESS NOTES
Pt BP elevated 150/80s in office and at home.   Asked that she increase lisinopril to 20 mg BID.

## 2024-02-15 NOTE — TELEPHONE ENCOUNTER
Pt. Has a heart monitor that gave her error messages last night, Wants to discuss.    Pt. Phone - [phone]

## 2024-02-15 NOTE — PROGRESS NOTES
Chief Complaint   Patient presents with    Other     BP CHECK     Vitals:    02/15/24 1502   Height: 1.6 m (5' 3\")      Ht 1.6 m (5' 3\")   LMP  (LMP Unknown)   BMI 28.87 kg/m²

## 2024-02-15 NOTE — TELEPHONE ENCOUNTER
Pt. Got a new BP machine, wants to know if she is able to stop by the IB location to see if it is working correctly.     Pt. Phone - [phone]

## 2024-02-20 ENCOUNTER — HOSPITAL ENCOUNTER (OUTPATIENT)
Facility: HOSPITAL | Age: 64
Discharge: HOME | End: 2024-02-23
Payer: OTHER GOVERNMENT

## 2024-02-20 DIAGNOSIS — R10.9 RIGHT SIDED ABDOMINAL PAIN: ICD-10-CM

## 2024-02-20 LAB — CREAT BLD-MCNC: 0.6 MG/DL (ref 0.6–1.3)

## 2024-02-20 PROCEDURE — 82565 ASSAY OF CREATININE: CPT

## 2024-02-20 PROCEDURE — 74177 CT ABD & PELVIS W/CONTRAST: CPT

## 2024-02-20 PROCEDURE — 6360000004 HC RX CONTRAST MEDICATION: Performed by: INTERNAL MEDICINE

## 2024-02-20 RX ADMIN — IOPAMIDOL 100 ML: 755 INJECTION, SOLUTION INTRAVENOUS at 13:53

## 2024-02-29 LAB — ECHO BSA: 1.81 M2

## 2024-03-01 ENCOUNTER — TELEPHONE (OUTPATIENT)
Age: 64
End: 2024-03-01

## 2024-03-01 NOTE — TELEPHONE ENCOUNTER
Called pt,  LVM    Per DANILO Evangelista, NP: \"Will you call her and check in?   Here are monitor results.   Symptoms of heart racing, dizziness were associated with NSR, sinus tach.   Her avg HR was 76 bpm   I know she is trying to get things worked out with her thyroid.   If she continues with symptoms we can try toprol xl 25 mg daily and see if this helps.\"

## 2024-03-07 ENCOUNTER — ANCILLARY PROCEDURE (OUTPATIENT)
Age: 64
End: 2024-03-07
Payer: OTHER GOVERNMENT

## 2024-03-07 VITALS
SYSTOLIC BLOOD PRESSURE: 138 MMHG | HEART RATE: 74 BPM | DIASTOLIC BLOOD PRESSURE: 84 MMHG | BODY MASS INDEX: 28.88 KG/M2 | HEIGHT: 63 IN | WEIGHT: 163 LBS

## 2024-03-07 DIAGNOSIS — I10 ESSENTIAL (PRIMARY) HYPERTENSION: ICD-10-CM

## 2024-03-07 DIAGNOSIS — R42 DIZZINESS: ICD-10-CM

## 2024-03-07 DIAGNOSIS — R00.2 PALPITATIONS: ICD-10-CM

## 2024-03-07 DIAGNOSIS — R07.89 CHEST PRESSURE: ICD-10-CM

## 2024-03-07 LAB
ECHO AO ASC DIAM: 3.1 CM
ECHO AO ASCENDING AORTA INDEX: 1.75 CM/M2
ECHO AO ROOT DIAM: 3.2 CM
ECHO AO ROOT INDEX: 1.81 CM/M2
ECHO AV AREA PEAK VELOCITY: 2.5 CM2
ECHO AV AREA VTI: 2.7 CM2
ECHO AV AREA/BSA PEAK VELOCITY: 1.4 CM2/M2
ECHO AV AREA/BSA VTI: 1.5 CM2/M2
ECHO AV MEAN GRADIENT: 4 MMHG
ECHO AV MEAN VELOCITY: 1 M/S
ECHO AV PEAK GRADIENT: 7 MMHG
ECHO AV PEAK VELOCITY: 1.3 M/S
ECHO AV VELOCITY RATIO: 0.92
ECHO AV VTI: 27.4 CM
ECHO BSA: 1.81 M2
ECHO LA DIAMETER INDEX: 1.75 CM/M2
ECHO LA DIAMETER: 3.1 CM
ECHO LA TO AORTIC ROOT RATIO: 0.97
ECHO LA VOL A-L A2C: 45 ML (ref 22–52)
ECHO LA VOL A-L A4C: 42 ML (ref 22–52)
ECHO LA VOL BP: 41 ML (ref 22–52)
ECHO LA VOL MOD A2C: 43 ML (ref 22–52)
ECHO LA VOL MOD A4C: 38 ML (ref 22–52)
ECHO LA VOL/BSA BIPLANE: 23 ML/M2 (ref 16–34)
ECHO LA VOLUME AREA LENGTH: 45 ML
ECHO LA VOLUME INDEX A-L A2C: 25 ML/M2 (ref 16–34)
ECHO LA VOLUME INDEX A-L A4C: 24 ML/M2 (ref 16–34)
ECHO LA VOLUME INDEX AREA LENGTH: 25 ML/M2 (ref 16–34)
ECHO LA VOLUME INDEX MOD A2C: 24 ML/M2 (ref 16–34)
ECHO LA VOLUME INDEX MOD A4C: 21 ML/M2 (ref 16–34)
ECHO LV E' LATERAL VELOCITY: 11 CM/S
ECHO LV E' SEPTAL VELOCITY: 7 CM/S
ECHO LV EDV A2C: 72 ML
ECHO LV EDV A4C: 78 ML
ECHO LV EDV BP: 74 ML (ref 56–104)
ECHO LV EDV INDEX A4C: 44 ML/M2
ECHO LV EDV INDEX BP: 42 ML/M2
ECHO LV EDV NDEX A2C: 41 ML/M2
ECHO LV EJECTION FRACTION A2C: 61 %
ECHO LV EJECTION FRACTION A4C: 59 %
ECHO LV EJECTION FRACTION BIPLANE: 59 % (ref 55–100)
ECHO LV ESV A2C: 28 ML
ECHO LV ESV A4C: 32 ML
ECHO LV ESV BP: 30 ML (ref 19–49)
ECHO LV ESV INDEX A2C: 16 ML/M2
ECHO LV ESV INDEX A4C: 18 ML/M2
ECHO LV ESV INDEX BP: 17 ML/M2
ECHO LV FRACTIONAL SHORTENING: 33 % (ref 28–44)
ECHO LV INTERNAL DIMENSION DIASTOLE INDEX: 2.6 CM/M2
ECHO LV INTERNAL DIMENSION DIASTOLIC: 4.6 CM (ref 3.9–5.3)
ECHO LV INTERNAL DIMENSION SYSTOLIC INDEX: 1.75 CM/M2
ECHO LV INTERNAL DIMENSION SYSTOLIC: 3.1 CM
ECHO LV IVSD: 1 CM (ref 0.6–0.9)
ECHO LV MASS 2D: 158.8 G (ref 67–162)
ECHO LV MASS INDEX 2D: 89.7 G/M2 (ref 43–95)
ECHO LV POSTERIOR WALL DIASTOLIC: 1 CM (ref 0.6–0.9)
ECHO LV RELATIVE WALL THICKNESS RATIO: 0.43
ECHO LVOT AREA: 2.8 CM2
ECHO LVOT AV VTI INDEX: 0.95
ECHO LVOT DIAM: 1.9 CM
ECHO LVOT MEAN GRADIENT: 3 MMHG
ECHO LVOT PEAK GRADIENT: 5 MMHG
ECHO LVOT PEAK VELOCITY: 1.2 M/S
ECHO LVOT STROKE VOLUME INDEX: 41.5 ML/M2
ECHO LVOT SV: 73.4 ML
ECHO LVOT VTI: 25.9 CM
ECHO MV A VELOCITY: 0.76 M/S
ECHO MV AREA PHT: 3.7 CM2
ECHO MV E DECELERATION TIME (DT): 204.5 MS
ECHO MV E VELOCITY: 0.73 M/S
ECHO MV E/A RATIO: 0.96
ECHO MV E/E' LATERAL: 6.64
ECHO MV E/E' RATIO (AVERAGED): 8.53
ECHO MV PRESSURE HALF TIME (PHT): 59.3 MS
ECHO RV INTERNAL DIMENSION: 3.4 CM

## 2024-03-07 PROCEDURE — 93306 TTE W/DOPPLER COMPLETE: CPT | Performed by: SPECIALIST

## 2024-03-08 NOTE — RESULT ENCOUNTER NOTE
Dear Ms. Bryan,  Good News!  Your echo is normal. Hope you are feeling better.   Please let me know if you have questions.  Best Regards,  BRANDO Guadalupe NP

## 2024-03-26 ENCOUNTER — OFFICE VISIT (OUTPATIENT)
Age: 64
End: 2024-03-26
Payer: OTHER GOVERNMENT

## 2024-03-26 VITALS
WEIGHT: 163 LBS | DIASTOLIC BLOOD PRESSURE: 84 MMHG | BODY MASS INDEX: 28.88 KG/M2 | OXYGEN SATURATION: 98 % | SYSTOLIC BLOOD PRESSURE: 168 MMHG | HEIGHT: 63 IN | HEART RATE: 114 BPM

## 2024-03-26 DIAGNOSIS — R07.9 CHEST PAIN, UNSPECIFIED TYPE: ICD-10-CM

## 2024-03-26 DIAGNOSIS — I10 ESSENTIAL (PRIMARY) HYPERTENSION: Primary | ICD-10-CM

## 2024-03-26 PROCEDURE — 99214 OFFICE O/P EST MOD 30 MIN: CPT | Performed by: SPECIALIST

## 2024-03-26 PROCEDURE — 3078F DIAST BP <80 MM HG: CPT | Performed by: SPECIALIST

## 2024-03-26 PROCEDURE — 3077F SYST BP >= 140 MM HG: CPT | Performed by: SPECIALIST

## 2024-03-26 RX ORDER — LISINOPRIL 10 MG/1
15 TABLET ORAL 2 TIMES DAILY
Qty: 270 TABLET | Refills: 3 | Status: SHIPPED | OUTPATIENT
Start: 2024-03-26

## 2024-03-26 NOTE — PROGRESS NOTES
Patient: Marjorie Bryan  : 1960    Primary Cardiologist: Selam Hopson MD. [facility]  Last Office Visit: 23    Today's Date: 3/26/2024      HISTORY OF PRESENT ILLNESS:     History of Present Illness:  Has been having palpitations and near-syncope. Finds her symptoms are worse when her blood pressure medication is at higher doses. No CP, SOB, PATEL.    Reports she is seeing endocrinology for an adrenal gland workup.      PAST MEDICAL HISTORY:     Past Medical History:   Diagnosis Date    Headache     Hypertension     Hypothyroidism     Insomnia     Melanoma (HCC)     Mild cognitive impairment     Muscle pain        Past Surgical History:   Procedure Laterality Date    APPENDECTOMY      CHOLECYSTECTOMY      COLONOSCOPY N/A 6/10/2020    COLONOSCOPY performed by Judson Cuevas MD at [facility] ENDOSCOPY    GYN      HEENT      OTHER SURGICAL HISTORY      Total thyroidectomy       CURRENT MEDICATIONS:    .  Current Outpatient Medications   Medication Sig Dispense Refill    Omega-3 Fatty Acids (FISH OIL) 300 MG CAPS Take by mouth daily      vitamin D (CHOLECALCIFEROL) 25 MCG (1000 UT) TABS tablet Take by mouth daily      cyclobenzaprine (FLEXERIL) 10 MG tablet daily as needed      levothyroxine (SYNTHROID) 100 MCG tablet Take 1 tablet by mouth every morning (before breakfast)      liothyronine (CYTOMEL) 5 MCG tablet Take 1 tablet by mouth 5 mg am; 2.5 mg in pm      lisinopril (PRINIVIL;ZESTRIL) 20 MG tablet Take 15 mg by mouth in the morning and at bedtime      MAGNESIUM OXIDE PO Take 300 mg by mouth daily       No current facility-administered medications for this visit.       Allergies   Allergen Reactions    Latex Rash    Ciprofloxacin Other (See Comments)     Interacts with thyroid medication    Hydrochlorothiazide Other (See Comments)     Makes bp elevated & alters thyroid levels    Iodine Other (See Comments)     Iodine dye; can take oral medication.  Causes high blood pressure and dizziness

## 2024-03-26 NOTE — PROGRESS NOTES
Chief Complaint   Patient presents with    Follow-up     1 month    Hypertension     Vitals:    03/26/24 0927 03/26/24 0936   BP: (!) 160/76 (!) 168/84   Site: Left Upper Arm Left Upper Arm   Position: Sitting Sitting   Cuff Size: Large Adult Medium Adult   Pulse: (!) 114    SpO2: 98%    Weight: 73.9 kg (163 lb)    Height: 1.6 m (5' 3\")        Chest pain NO     ER, urgent care, or hospitalized outside of [facility] since your last visit?  NO     Refills NO     Lisinopril 15 mg BID d/t dizziness.  Feels better since change.    Seeing Endocrinology; did adrenal blood tests.  Done on Friday.  Dr. Quispe

## 2024-11-05 ENCOUNTER — OFFICE VISIT (OUTPATIENT)
Age: 64
End: 2024-11-05
Payer: OTHER GOVERNMENT

## 2024-11-05 VITALS
OXYGEN SATURATION: 99 % | WEIGHT: 165 LBS | HEART RATE: 88 BPM | DIASTOLIC BLOOD PRESSURE: 88 MMHG | BODY MASS INDEX: 29.23 KG/M2 | HEIGHT: 63 IN | SYSTOLIC BLOOD PRESSURE: 138 MMHG

## 2024-11-05 DIAGNOSIS — I10 ESSENTIAL (PRIMARY) HYPERTENSION: Primary | ICD-10-CM

## 2024-11-05 DIAGNOSIS — E78.5 DYSLIPIDEMIA: ICD-10-CM

## 2024-11-05 PROCEDURE — 3075F SYST BP GE 130 - 139MM HG: CPT | Performed by: SPECIALIST

## 2024-11-05 PROCEDURE — 3079F DIAST BP 80-89 MM HG: CPT | Performed by: SPECIALIST

## 2024-11-05 PROCEDURE — 99214 OFFICE O/P EST MOD 30 MIN: CPT | Performed by: SPECIALIST

## 2024-11-05 RX ORDER — LISINOPRIL 20 MG/1
20 TABLET ORAL 2 TIMES DAILY
COMMUNITY

## 2024-11-05 NOTE — PROGRESS NOTES
Chief Complaint   Patient presents with    Hypertension     Vitals:    11/05/24 0900   BP: 138/88   Site: Left Upper Arm   Position: Sitting   Cuff Size: Medium Adult   Pulse: 88   SpO2: 99%   Weight: 74.8 kg (165 lb)   Height: 1.6 m (5' 3\")      /88 (Site: Left Upper Arm, Position: Sitting, Cuff Size: Medium Adult)   Pulse 88   Ht 1.6 m (5' 3\")   Wt 74.8 kg (165 lb)   SpO2 99%   BMI 29.23 kg/m²

## 2024-11-05 NOTE — PATIENT INSTRUCTIONS
Patient Education        Learning About the Mediterranean Diet  What is the Mediterranean diet?     The Mediterranean diet is a style of eating rather than a diet plan. It features foods eaten in [place], [place], southern [place], and other countries along the [place] Sea. It emphasizes eating foods like fish, fruits, vegetables, beans, high-fiber breads and whole grains, nuts, and olive oil. This style of eating includes limited red meat, cheese, and sweets.  Why choose the Mediterranean diet?  A Mediterranean-style diet may improve heart health. It contains more fat than other heart-healthy diets. But the fats are mainly from nuts, unsaturated oils (such as fish oils and olive oil), and certain nut or seed oils (such as canola, soybean, or flaxseed oil). These fats may help protect the heart and blood vessels.  How can you get started on the Mediterranean diet?  Here are some things you can do to switch to a more Mediterranean way of eating.  What to eat  Eat a variety of fruits and vegetables each day, such as grapes, blueberries, tomatoes, broccoli, peppers, figs, olives, spinach, eggplant, beans, lentils, and chickpeas.  Eat a variety of whole-grain foods each day, such as oats, brown rice, and whole wheat bread, pasta, and couscous.  Eat fish at least 2 times a week. Try tuna, salmon, mackerel, lake trout, herring, or sardines.  Eat moderate amounts of low-fat dairy products, such as milk, cheese, or yogurt.  Eat moderate amounts of poultry and eggs.  Choose healthy (unsaturated) fats, such as nuts, olive oil, and certain nut or seed oils like canola, soybean, and flaxseed.  Limit unhealthy (saturated) fats, such as butter, palm oil, and coconut oil. And limit fats found in animal products, such as meat and dairy products made with whole milk. Try to eat red meat only a few times a month in very small amounts.  Limit sweets and desserts to only a few times a week. This includes sugar-sweetened

## 2024-11-05 NOTE — PROGRESS NOTES
Patient: Marjorie Bryan  : 1960    Primary Cardiologist: Selam Hopson MD. [facility]  Last Office Visit:     Today's Date: 2024      HISTORY OF PRESENT ILLNESS:     History of Present Illness:  She is doing well - SBP mostly 120's or so.  No complaints.         PAST MEDICAL HISTORY:     Past Medical History:   Diagnosis Date    Headache     Hypertension     Hypothyroidism     Insomnia     Melanoma (HCC)     Mild cognitive impairment     Muscle pain        Past Surgical History:   Procedure Laterality Date    APPENDECTOMY      CHOLECYSTECTOMY      COLONOSCOPY N/A 6/10/2020    COLONOSCOPY performed by Judson Cuevas MD at [facility] ENDOSCOPY    GYN      HEENT      OTHER SURGICAL HISTORY      Total thyroidectomy       CURRENT MEDICATIONS:    .  Current Outpatient Medications   Medication Sig Dispense Refill    Multiple Vitamin (MULTIVITAMIN ADULT PO) Take by mouth      lisinopril (PRINIVIL;ZESTRIL) 20 MG tablet Take 1 tablet by mouth in the morning and at bedtime      Omega-3 Fatty Acids (FISH OIL) 300 MG CAPS Take by mouth daily      vitamin D (CHOLECALCIFEROL) 25 MCG (1000 UT) TABS tablet Take by mouth daily      cyclobenzaprine (FLEXERIL) 10 MG tablet daily as needed      levothyroxine (SYNTHROID) 100 MCG tablet Take 1 tablet by mouth every morning (before breakfast)      liothyronine (CYTOMEL) 5 MCG tablet Take 1 tablet by mouth 5 mg am; 2.5 mg in pm      MAGNESIUM OXIDE PO Take 300 mg by mouth daily       No current facility-administered medications for this visit.       Allergies   Allergen Reactions    Latex Rash    Ciprofloxacin Other (See Comments)     Interacts with thyroid medication    Hydrochlorothiazide Other (See Comments)     Makes bp elevated & alters thyroid levels    Iodine Other (See Comments)     Iodine dye; can take oral medication.  Causes high blood pressure and dizziness    Nitrofurantoin Nausea Only    Codeine Nausea Only     SOCIAL HISTORY:     Social History     Tobacco

## 2025-06-21 ENCOUNTER — APPOINTMENT (OUTPATIENT)
Facility: HOSPITAL | Age: 65
End: 2025-06-21
Payer: OTHER GOVERNMENT

## 2025-06-21 ENCOUNTER — HOSPITAL ENCOUNTER (EMERGENCY)
Facility: HOSPITAL | Age: 65
Discharge: HOME | End: 2025-06-22
Attending: STUDENT IN AN ORGANIZED HEALTH CARE EDUCATION/TRAINING PROGRAM
Payer: OTHER GOVERNMENT

## 2025-06-21 DIAGNOSIS — R42 DIZZINESS: Primary | ICD-10-CM

## 2025-06-21 LAB
ALBUMIN SERPL-MCNC: 4.6 G/DL (ref 3.5–5.2)
ALBUMIN/GLOB SERPL: 1.6 (ref 1.1–2.2)
ALP SERPL-CCNC: 62 U/L (ref 35–104)
ALT SERPL-CCNC: 7 U/L (ref 10–35)
ANION GAP SERPL CALC-SCNC: 11 MMOL/L (ref 2–12)
APPEARANCE UR: ABNORMAL
AST SERPL-CCNC: 20 U/L (ref 10–35)
BACTERIA URNS QL MICRO: NEGATIVE /HPF
BASOPHILS # BLD: 0.1 K/UL (ref 0–0.1)
BASOPHILS NFR BLD: 1.1 % (ref 0–1)
BILIRUB SERPL-MCNC: 0.2 MG/DL (ref 0.2–1)
BILIRUB UR QL: NEGATIVE
BUN SERPL-MCNC: 16 MG/DL (ref 8–23)
BUN/CREAT SERPL: 28 (ref 12–20)
CALCIUM SERPL-MCNC: 9.3 MG/DL (ref 8.8–10.2)
CHLORIDE SERPL-SCNC: 99 MMOL/L (ref 98–107)
CO2 SERPL-SCNC: 28 MMOL/L (ref 22–29)
COLOR UR: ABNORMAL
CREAT SERPL-MCNC: 0.58 MG/DL (ref 0.5–0.9)
D DIMER PPP FEU-MCNC: <0.27 UG/ML(FEU)
DIFFERENTIAL METHOD BLD: ABNORMAL
EOSINOPHIL # BLD: 0.37 K/UL (ref 0–0.4)
EOSINOPHIL NFR BLD: 4 % (ref 0–7)
EPITH CASTS URNS QL MICRO: ABNORMAL /LPF
ERYTHROCYTE [DISTWIDTH] IN BLOOD BY AUTOMATED COUNT: 13.2 % (ref 11.5–14.5)
GLOBULIN SER CALC-MCNC: 2.9 G/DL (ref 2–4)
GLUCOSE SERPL-MCNC: 114 MG/DL (ref 65–100)
GLUCOSE UR STRIP.AUTO-MCNC: NEGATIVE MG/DL
HCT VFR BLD AUTO: 42.6 % (ref 35–47)
HGB BLD-MCNC: 13.9 G/DL (ref 11.5–16)
HGB UR QL STRIP: ABNORMAL
IMM GRANULOCYTES # BLD AUTO: 0.03 K/UL (ref 0–0.04)
IMM GRANULOCYTES NFR BLD AUTO: 0.3 % (ref 0–0.5)
KETONES UR QL STRIP.AUTO: NEGATIVE MG/DL
LEUKOCYTE ESTERASE UR QL STRIP.AUTO: ABNORMAL
LYMPHOCYTES # BLD: 3.61 K/UL (ref 0.8–3.5)
LYMPHOCYTES NFR BLD: 39.1 % (ref 12–49)
MCH RBC QN AUTO: 27.4 PG (ref 26–34)
MCHC RBC AUTO-ENTMCNC: 32.6 G/DL (ref 30–36.5)
MCV RBC AUTO: 83.9 FL (ref 80–99)
MONOCYTES # BLD: 0.74 K/UL (ref 0–1)
MONOCYTES NFR BLD: 8 % (ref 5–13)
NEUTS SEG # BLD: 4.38 K/UL (ref 1.8–8)
NEUTS SEG NFR BLD: 47.5 % (ref 32–75)
NITRITE UR QL STRIP.AUTO: NEGATIVE
NRBC # BLD: 0 K/UL (ref 0–0.01)
NRBC BLD-RTO: 0 PER 100 WBC
PH UR STRIP: 6.5 (ref 5–8)
PLATELET # BLD AUTO: 275 K/UL (ref 150–400)
PMV BLD AUTO: 8.9 FL (ref 8.9–12.9)
POTASSIUM SERPL-SCNC: 4.2 MMOL/L (ref 3.5–5.1)
PROT SERPL-MCNC: 7.5 G/DL (ref 6.4–8.3)
PROT UR STRIP-MCNC: NEGATIVE MG/DL
RBC # BLD AUTO: 5.08 M/UL (ref 3.8–5.2)
RBC #/AREA URNS HPF: ABNORMAL /HPF
SODIUM SERPL-SCNC: 138 MMOL/L (ref 136–145)
SP GR UR REFRACTOMETRY: 1.01 (ref 1–1.03)
TROPONIN T SERPL HS-MCNC: <6 NG/L (ref 0–14)
URINE CULTURE IF INDICATED: ABNORMAL
UROBILINOGEN UR QL STRIP.AUTO: 0.2 EU/DL (ref 0.2–1)
WBC # BLD AUTO: 9.2 K/UL (ref 3.6–11)
WBC URNS QL MICRO: ABNORMAL /HPF (ref 0–4)

## 2025-06-21 PROCEDURE — 93005 ELECTROCARDIOGRAM TRACING: CPT | Performed by: STUDENT IN AN ORGANIZED HEALTH CARE EDUCATION/TRAINING PROGRAM

## 2025-06-21 PROCEDURE — 71046 X-RAY EXAM CHEST 2 VIEWS: CPT

## 2025-06-21 PROCEDURE — 85379 FIBRIN DEGRADATION QUANT: CPT

## 2025-06-21 PROCEDURE — 85025 COMPLETE CBC W/AUTO DIFF WBC: CPT

## 2025-06-21 PROCEDURE — 6370000000 HC RX 637 (ALT 250 FOR IP): Performed by: STUDENT IN AN ORGANIZED HEALTH CARE EDUCATION/TRAINING PROGRAM

## 2025-06-21 PROCEDURE — 84484 ASSAY OF TROPONIN QUANT: CPT

## 2025-06-21 PROCEDURE — 80053 COMPREHEN METABOLIC PANEL: CPT

## 2025-06-21 PROCEDURE — 2580000003 HC RX 258: Performed by: STUDENT IN AN ORGANIZED HEALTH CARE EDUCATION/TRAINING PROGRAM

## 2025-06-21 PROCEDURE — 36415 COLL VENOUS BLD VENIPUNCTURE: CPT

## 2025-06-21 PROCEDURE — 81001 URINALYSIS AUTO W/SCOPE: CPT

## 2025-06-21 PROCEDURE — 96360 HYDRATION IV INFUSION INIT: CPT

## 2025-06-21 PROCEDURE — 99285 EMERGENCY DEPT VISIT HI MDM: CPT

## 2025-06-21 RX ORDER — SODIUM CHLORIDE, SODIUM LACTATE, POTASSIUM CHLORIDE, AND CALCIUM CHLORIDE .6; .31; .03; .02 G/100ML; G/100ML; G/100ML; G/100ML
1000 INJECTION, SOLUTION INTRAVENOUS ONCE
Status: COMPLETED | OUTPATIENT
Start: 2025-06-21 | End: 2025-06-21

## 2025-06-21 RX ORDER — MECLIZINE HYDROCHLORIDE 25 MG/1
25 TABLET ORAL 3 TIMES DAILY PRN
Qty: 15 TABLET | Refills: 0 | Status: SHIPPED | OUTPATIENT
Start: 2025-06-21 | End: 2025-07-01

## 2025-06-21 RX ORDER — MECLIZINE HYDROCHLORIDE 25 MG/1
25 TABLET ORAL
Status: COMPLETED | OUTPATIENT
Start: 2025-06-21 | End: 2025-06-21

## 2025-06-21 RX ADMIN — MECLIZINE HYDROCHLORIDE 25 MG: 25 TABLET ORAL at 21:12

## 2025-06-21 RX ADMIN — SODIUM CHLORIDE, SODIUM LACTATE, POTASSIUM CHLORIDE, AND CALCIUM CHLORIDE 1000 ML: .6; .31; .03; .02 INJECTION, SOLUTION INTRAVENOUS at 21:12

## 2025-06-21 ASSESSMENT — LIFESTYLE VARIABLES
HOW OFTEN DO YOU HAVE A DRINK CONTAINING ALCOHOL: NEVER
HOW MANY STANDARD DRINKS CONTAINING ALCOHOL DO YOU HAVE ON A TYPICAL DAY: PATIENT DOES NOT DRINK

## 2025-06-21 ASSESSMENT — PAIN - FUNCTIONAL ASSESSMENT: PAIN_FUNCTIONAL_ASSESSMENT: NONE - DENIES PAIN

## 2025-06-22 VITALS
DIASTOLIC BLOOD PRESSURE: 66 MMHG | HEART RATE: 68 BPM | SYSTOLIC BLOOD PRESSURE: 122 MMHG | RESPIRATION RATE: 16 BRPM | TEMPERATURE: 98.1 F | OXYGEN SATURATION: 99 % | WEIGHT: 167 LBS | BODY MASS INDEX: 29.59 KG/M2 | HEIGHT: 63 IN

## 2025-06-22 LAB
EKG ATRIAL RATE: 88 BPM
EKG DIAGNOSIS: NORMAL
EKG P AXIS: 45 DEGREES
EKG P-R INTERVAL: 166 MS
EKG Q-T INTERVAL: 360 MS
EKG QRS DURATION: 64 MS
EKG QTC CALCULATION (BAZETT): 435 MS
EKG R AXIS: 28 DEGREES
EKG T AXIS: 46 DEGREES
EKG VENTRICULAR RATE: 88 BPM

## 2025-06-22 NOTE — ED TRIAGE NOTES
Pt arrives POV c/o dizziness and extreme thirst since today. Pt endorses SOB with going up stairs. Pt denies any current CP, SON, N/V/D.

## 2025-06-22 NOTE — ED NOTES
Pt given discharge instructions, pt education, 1 prescriptions and follow up information. Pt verbalizes understanding. All questions answered. Pt discharged to home in private vehicle with family, ambulatory. Pt A&O x4, RA, pain controlled.

## 2025-06-22 NOTE — DISCHARGE INSTRUCTIONS
You have been evaluated in the Emergency Department today for dizziness.    You have been prescribed meclizine to help relieve your symptoms. Please take your prescription as directed.     Please follow up with your primary care doctor in 2-3 days.    Return to the ER immediately for worsening or uncontrolled symptoms, worsening headache, chest pain, shortness of breath, persistent vomiting, vision changes, fainting, muscle weakness, changes in speech, or for any other concerning symptoms.    Thank you for choosing us for your care.

## 2025-06-23 PROCEDURE — 93010 ELECTROCARDIOGRAM REPORT: CPT | Performed by: INTERNAL MEDICINE

## 2025-06-24 NOTE — ED PROVIDER NOTES
[facility]  EMERGENCY DEPARTMENT ENCOUNTER      Pt Name: Marjorie Bryan  MRN: [id number]  Birthdate 1960  Date of evaluation: 2025  Provider: Taylor Myers MD    CHIEF COMPLAINT       Chief Complaint   Patient presents with    Dizziness       ALLERGIES     Latex, Ciprofloxacin, Hydrochlorothiazide, Iodine, Nitrofurantoin, and Codeine    ENCOUNTER     Past Medical History:   Diagnosis Date    Headache     Hypertension     Hypothyroidism     Insomnia     Melanoma (HCC)     Mild cognitive impairment     Muscle pain        HISTORY OF PRESENT ILLNESS  64-year-old female with history of melanoma on her left hand (scheduled for removal), hypercholesterolemia, hypothyroidism (status post thyroidectomy and ablation), and prior cholecystectomy, appendectomy, and two  sections presents with dizziness that began this morning, described as intermittent episodes lasting minutes, not related to changes in position or direction. She reports feeling \"out of it\" and experiencing significant thirst today, leading her to drink water throughout the day. She also notes new-onset shortness of breath only when going upstairs, starting today. Recent primary care labs this week showed CO2 16 mmol/L, elevated cholesterol, and glucose 98 mg/dL (previously 87 mg/dL in April), with no history of diabetes or blood sugar issues. She denies chest pain and headache. Denies tobacco or alcohol use.    HEALTHCARE PROVIDERS  - PCP - name not provided (recent labs this week)    PAST MEDICAL HISTORY  - Melanoma on left hand (scheduled for removal)  - Hypercholesterolemia  - Hypothyroidism    PAST SURGICAL HISTORY  - Cholecystectomy  - Appendectomy  - Two  sections  - Thyroidectomy  - Ablation    SOCIAL HISTORY  - Denies tobacco or alcohol use    PHYSICAL EXAM  Vitals: Hypertensive on arrival (resolved without intervention), during ED course vitals otherwise within normal limits.  General: NAD.

## 2025-07-19 ENCOUNTER — HOSPITAL ENCOUNTER (EMERGENCY)
Facility: HOSPITAL | Age: 65
Discharge: HOME | End: 2025-07-19
Attending: EMERGENCY MEDICINE
Payer: OTHER GOVERNMENT

## 2025-07-19 VITALS
RESPIRATION RATE: 10 BRPM | SYSTOLIC BLOOD PRESSURE: 136 MMHG | DIASTOLIC BLOOD PRESSURE: 66 MMHG | OXYGEN SATURATION: 96 % | BODY MASS INDEX: 31.68 KG/M2 | HEART RATE: 93 BPM | WEIGHT: 178.79 LBS | TEMPERATURE: 98.3 F | HEIGHT: 63 IN

## 2025-07-19 DIAGNOSIS — I48.91 ATRIAL FIBRILLATION, UNSPECIFIED TYPE (HCC): Primary | ICD-10-CM

## 2025-07-19 LAB
ALBUMIN SERPL-MCNC: 4.6 G/DL (ref 3.5–5.2)
ALBUMIN/GLOB SERPL: 1.4 (ref 1.1–2.2)
ALP SERPL-CCNC: 69 U/L (ref 35–104)
ALT SERPL-CCNC: 8 U/L (ref 10–35)
ANION GAP SERPL CALC-SCNC: 13 MMOL/L (ref 2–12)
AST SERPL-CCNC: 24 U/L (ref 10–35)
BASOPHILS # BLD: 0.08 K/UL (ref 0–0.1)
BASOPHILS NFR BLD: 0.7 % (ref 0–1)
BILIRUB SERPL-MCNC: 0.3 MG/DL (ref 0.2–1)
BUN SERPL-MCNC: 21 MG/DL (ref 8–23)
BUN/CREAT SERPL: 34 (ref 12–20)
CALCIUM SERPL-MCNC: 10 MG/DL (ref 8.8–10.2)
CHLORIDE SERPL-SCNC: 100 MMOL/L (ref 98–107)
CO2 SERPL-SCNC: 26 MMOL/L (ref 22–29)
CREAT SERPL-MCNC: 0.61 MG/DL (ref 0.5–0.9)
DIFFERENTIAL METHOD BLD: ABNORMAL
EOSINOPHIL # BLD: 0.2 K/UL (ref 0–0.4)
EOSINOPHIL NFR BLD: 1.7 % (ref 0–7)
ERYTHROCYTE [DISTWIDTH] IN BLOOD BY AUTOMATED COUNT: 13.7 % (ref 11.5–14.5)
GLOBULIN SER CALC-MCNC: 3.3 G/DL (ref 2–4)
GLUCOSE SERPL-MCNC: 174 MG/DL (ref 65–100)
HCT VFR BLD AUTO: 44.7 % (ref 35–47)
HGB BLD-MCNC: 14.9 G/DL (ref 11.5–16)
IMM GRANULOCYTES # BLD AUTO: 0.03 K/UL (ref 0–0.04)
IMM GRANULOCYTES NFR BLD AUTO: 0.3 % (ref 0–0.5)
LYMPHOCYTES # BLD: 2.37 K/UL (ref 0.8–3.5)
LYMPHOCYTES NFR BLD: 20.2 % (ref 12–49)
MAGNESIUM SERPL-MCNC: 2 MG/DL (ref 1.6–2.4)
MCH RBC QN AUTO: 28.1 PG (ref 26–34)
MCHC RBC AUTO-ENTMCNC: 33.3 G/DL (ref 30–36.5)
MCV RBC AUTO: 84.2 FL (ref 80–99)
MONOCYTES # BLD: 0.85 K/UL (ref 0–1)
MONOCYTES NFR BLD: 7.2 % (ref 5–13)
NEUTS SEG # BLD: 8.21 K/UL (ref 1.8–8)
NEUTS SEG NFR BLD: 69.9 % (ref 32–75)
NRBC # BLD: 0 K/UL (ref 0–0.01)
NRBC BLD-RTO: 0 PER 100 WBC
PLATELET # BLD AUTO: 296 K/UL (ref 150–400)
PMV BLD AUTO: 9.2 FL (ref 8.9–12.9)
POTASSIUM SERPL-SCNC: 4 MMOL/L (ref 3.5–5.1)
PROT SERPL-MCNC: 7.9 G/DL (ref 6.4–8.3)
RBC # BLD AUTO: 5.31 M/UL (ref 3.8–5.2)
SODIUM SERPL-SCNC: 139 MMOL/L (ref 136–145)
WBC # BLD AUTO: 11.7 K/UL (ref 3.6–11)

## 2025-07-19 PROCEDURE — 99284 EMERGENCY DEPT VISIT MOD MDM: CPT

## 2025-07-19 PROCEDURE — 85025 COMPLETE CBC W/AUTO DIFF WBC: CPT

## 2025-07-19 PROCEDURE — 83735 ASSAY OF MAGNESIUM: CPT

## 2025-07-19 PROCEDURE — 93005 ELECTROCARDIOGRAM TRACING: CPT | Performed by: EMERGENCY MEDICINE

## 2025-07-19 PROCEDURE — 80053 COMPREHEN METABOLIC PANEL: CPT

## 2025-07-19 PROCEDURE — 2500000003 HC RX 250 WO HCPCS: Performed by: EMERGENCY MEDICINE

## 2025-07-19 PROCEDURE — 6370000000 HC RX 637 (ALT 250 FOR IP): Performed by: EMERGENCY MEDICINE

## 2025-07-19 PROCEDURE — 36415 COLL VENOUS BLD VENIPUNCTURE: CPT

## 2025-07-19 PROCEDURE — 93005 ELECTROCARDIOGRAM TRACING: CPT | Performed by: INTERNAL MEDICINE

## 2025-07-19 RX ORDER — ETOMIDATE 2 MG/ML
8 INJECTION INTRAVENOUS
Status: COMPLETED | OUTPATIENT
Start: 2025-07-19 | End: 2025-07-19

## 2025-07-19 RX ORDER — DILTIAZEM HYDROCHLORIDE 120 MG/1
120 CAPSULE, EXTENDED RELEASE ORAL DAILY
Qty: 30 CAPSULE | Refills: 0 | Status: CANCELLED | OUTPATIENT
Start: 2025-07-19

## 2025-07-19 RX ADMIN — ETOMIDATE 8 MG: 2 INJECTION, SOLUTION INTRAVENOUS at 15:01

## 2025-07-19 RX ADMIN — APIXABAN 5 MG: 5 TABLET, FILM COATED ORAL at 16:48

## 2025-07-19 ASSESSMENT — PAIN SCALES - GENERAL: PAINLEVEL_OUTOF10: 0

## 2025-07-19 ASSESSMENT — PAIN - FUNCTIONAL ASSESSMENT: PAIN_FUNCTIONAL_ASSESSMENT: 0-10

## 2025-07-19 NOTE — ED NOTES
ED SIGN OUT NOTE  Care assumed at [facility] 3:20 PM EDT    Patient was signed out to me by Dr. Gonzales.     Patient is awaiting cardiology consultation and monitoring after cardioversion.    CONCEPCION?DS?-VA Score is at least 1, borderline 2. Per Dr. Hopson's prior notes, OAC should be considered if she has afib. Will initiate Eliquis.      BP (!) 156/104   Pulse (!) 117   Temp 98.3 °F (36.8 °C) (Oral)   Resp 21   Ht 1.6 m (5' 3\")   Wt 81.1 kg (178 lb 12.7 oz)   SpO2 99%   BMI 31.67 kg/m²     Labs Reviewed   CBC WITH AUTO DIFFERENTIAL - Abnormal; Notable for the following components:       Result Value    WBC 11.7 (*)     RBC 5.31 (*)     Neutrophils Absolute 8.21 (*)     All other components within normal limits   COMPREHENSIVE METABOLIC PANEL - Abnormal; Notable for the following components:    Anion Gap 13 (*)     Glucose 174 (*)     BUN/Creatinine Ratio 34 (*)     ALT 8 (*)     All other components within normal limits   MAGNESIUM     No orders to display       ED Course as of 07/19/25 1520   Sat Jul 19, 2025   1345 EKG performed at 1:41 PM demonstrates A-fib RVR at rate of 150 with normal axis, no ischemic changes [JM]   1511 Repeat EKG at demonstrates sinus tachycardia rate of 104 [JM]      ED Course User Index  [JM] Mayo Gonzales MD       Diagnosis:   No diagnosis found.    Disposition:        Plan:   ***    Gavin Caballero MD

## 2025-07-19 NOTE — ED PROVIDER NOTES
[facility]  EMERGENCY DEPARTMENT ENCOUNTER      Pt Name: Marjorie Bryan  MRN: [id number]  Birthdate 1960  Date of evaluation: 7/19/2025  Provider: Mayo Gonzales MD      HISTORY OF PRESENT ILLNESS      64-year-old female history of headache, hypertension presents to the emergency department with a chief complaint of palpitations.  She sees cardiology and called her cardiologist with recommendation to be seen in the emergency department.  She reports episodes of palpitations.  No blood thinners.    The history is provided by the patient and medical records.           Nursing Notes were reviewed.    REVIEW OF SYSTEMS         Review of Systems        PAST MEDICAL HISTORY     Past Medical History:   Diagnosis Date    Headache     Hypertension     Hypothyroidism     Insomnia     Melanoma (HCC)     Mild cognitive impairment     Muscle pain          SURGICAL HISTORY       Past Surgical History:   Procedure Laterality Date    APPENDECTOMY      CHOLECYSTECTOMY      COLONOSCOPY N/A 6/10/2020    COLONOSCOPY performed by Judson Cuevas MD at [facility] ENDOSCOPY    GYN      HEENT      OTHER SURGICAL HISTORY  1995    Total thyroidectomy         CURRENT MEDICATIONS       Previous Medications    CYCLOBENZAPRINE (FLEXERIL) 10 MG TABLET    daily as needed    LEVOTHYROXINE (SYNTHROID) 100 MCG TABLET    Take 1 tablet by mouth every morning (before breakfast)    LIOTHYRONINE (CYTOMEL) 5 MCG TABLET    Take 1 tablet by mouth 5 mg am; 2.5 mg in pm    LISINOPRIL (PRINIVIL;ZESTRIL) 20 MG TABLET    Take 1 tablet by mouth in the morning and at bedtime    MAGNESIUM OXIDE PO    Take 300 mg by mouth daily    MULTIPLE VITAMIN (MULTIVITAMIN ADULT PO)    Take by mouth    OMEGA-3 FATTY ACIDS (FISH OIL) 300 MG CAPS    Take by mouth daily    VITAMIN D (CHOLECALCIFEROL) 25 MCG (1000 UT) TABS TABLET    Take by mouth daily       ALLERGIES     Latex, Ciprofloxacin, Hydrochlorothiazide, Iodine, Nitrofurantoin, and

## 2025-07-19 NOTE — ED TRIAGE NOTES
Patient ambulatory to ED c/o palpations that started at 11am. Reports HR at home in the 193, called cardiologist and was referred to ED for eval.  Denies hx of afib, takes ASA daily.

## 2025-07-19 NOTE — ED NOTES
Rapid assessment called for patient in lobby. Patient reports palpitations, and checking HR/EKG on apple watch.   HR irregular with rate of . Patient denies any current chest pain and SOB.     Patient to complete full triage when available.

## 2025-07-19 NOTE — DISCHARGE INSTRUCTIONS
You have been diagnosed with a heart rhythm condition called atrial fibrillation/atrial flutter.    [facility] will call to schedule an appointment for you with one of our electrophysiology (EP) providers (MD/DESHAWN) within the next 2-7 business days. If you do not receive a call, please reach out to our EP Clinic at [phone].    A few reminders while you await your EP appointment:    1. Taking your blood thinner (anticoagulant) as prescribed is important to reduce your risk of stroke. If your prescription will run out before your EP appointment, please call our EP Clinic at [phone] for a one-time refill.    2. Learning about atrial fibrillation/atrial flutter will help you understand your condition and treatment options. For more information regarding atrial fibrillation management, please visit:        [email or web address]    3. Below are additional QR codes you can use to learn more as you prepare for your EP appointment.

## 2025-07-20 LAB
EKG DIAGNOSIS: NORMAL
EKG Q-T INTERVAL: 280 MS
EKG QRS DURATION: 66 MS
EKG QTC CALCULATION (BAZETT): 442 MS
EKG R AXIS: 60 DEGREES
EKG T AXIS: 20 DEGREES
EKG VENTRICULAR RATE: 150 BPM

## 2025-07-20 PROCEDURE — 93010 ELECTROCARDIOGRAM REPORT: CPT | Performed by: STUDENT IN AN ORGANIZED HEALTH CARE EDUCATION/TRAINING PROGRAM

## 2025-07-21 LAB
EKG ATRIAL RATE: 104 BPM
EKG DIAGNOSIS: NORMAL
EKG P AXIS: 59 DEGREES
EKG P-R INTERVAL: 176 MS
EKG Q-T INTERVAL: 324 MS
EKG QRS DURATION: 64 MS
EKG QTC CALCULATION (BAZETT): 426 MS
EKG R AXIS: 41 DEGREES
EKG T AXIS: 46 DEGREES
EKG VENTRICULAR RATE: 104 BPM

## 2025-07-24 ENCOUNTER — TELEPHONE (OUTPATIENT)
Age: 65
End: 2025-07-24

## 2025-07-24 ENCOUNTER — OFFICE VISIT (OUTPATIENT)
Age: 65
End: 2025-07-24
Payer: OTHER GOVERNMENT

## 2025-07-24 VITALS
OXYGEN SATURATION: 98 % | DIASTOLIC BLOOD PRESSURE: 88 MMHG | BODY MASS INDEX: 31.54 KG/M2 | HEART RATE: 87 BPM | HEIGHT: 63 IN | WEIGHT: 178 LBS | SYSTOLIC BLOOD PRESSURE: 138 MMHG

## 2025-07-24 DIAGNOSIS — E78.5 DYSLIPIDEMIA: ICD-10-CM

## 2025-07-24 DIAGNOSIS — I48.0 PAROXYSMAL ATRIAL FIBRILLATION (HCC): Primary | ICD-10-CM

## 2025-07-24 DIAGNOSIS — I10 ESSENTIAL (PRIMARY) HYPERTENSION: ICD-10-CM

## 2025-07-24 DIAGNOSIS — R42 DIZZINESS: ICD-10-CM

## 2025-07-24 PROCEDURE — 3079F DIAST BP 80-89 MM HG: CPT

## 2025-07-24 PROCEDURE — 3075F SYST BP GE 130 - 139MM HG: CPT

## 2025-07-24 PROCEDURE — 93010 ELECTROCARDIOGRAM REPORT: CPT

## 2025-07-24 PROCEDURE — 99214 OFFICE O/P EST MOD 30 MIN: CPT

## 2025-07-24 PROCEDURE — 93005 ELECTROCARDIOGRAM TRACING: CPT

## 2025-07-24 RX ORDER — METOPROLOL SUCCINATE 25 MG/1
25 TABLET, EXTENDED RELEASE ORAL DAILY
Qty: 30 TABLET | Refills: 3 | Status: SHIPPED | OUTPATIENT
Start: 2025-07-24

## 2025-07-24 NOTE — PROGRESS NOTES
Chief Complaint   Patient presents with    Hypertension    Anemia    dyslipidemia     Vitals:    07/24/25 0901   BP: 138/88   BP Site: Left Upper Arm   Patient Position: Sitting   Pulse: 87   SpO2: 98%   Weight: 80.7 kg (178 lb)   Height: 1.6 m (5' 3\")     Chest pain: DENIED     Recent hospital stays: DENIED     Refills: DENIED   
changes.  Normal MPI.  LVEF 78%  Event Monitor 2/13/24 - wore 13 days - NSR,  Avg HR 76,  single run of 6 beat NSVT  Echo 3/7/24 - LVEF 55-60%, normal diastolic       EKG 11/22/22 - NSR, normal   EKG 2/9/23 - NSR, normal   EKG 3/14/23 - NSR, normal   EKG 2/8/2024 - sinus tachycardia, , right atrial enlargement  EKG 7/24/25 - sinus rhythm       ASSESSMENT AND PLAN:     Assessment and Plan:    1) atrial fibrillation    - Dr. Purdy noted some transient afib on 8/17 Holter, no OAC at the time  - recent ER visit 7/19/25 - with afib RVR, had cardioversion to sinus, remains in sinus today  - on 7/24/25 - continue eliquis 5 mg BID, discussed bleeding risk. She is concerned with bleeding risk due to history of retinal branch occlusion required multiple procedures to stop bleeding. Will start metoprolol succinate 25 mg daily, will refer to EP to discuss possible ablation/watchman. If any bleeding or vision changes she will contact clinic   - check 14 day monitor for afib burden  - echo 3/7/2024 - EF 55-60%, normal       2) HTN   - Brendon/renin and Pheo labs normal by Endocrinology 2024   - She says BP runs 120's/80 or so at home   - cont lisinopril   - follow BP at home, start toprol as above     3) Dyslipidemia and prediabetes   - CT Heart Scan 2/18/16 - CAC Score 0   - lipids mildly high  - dad had heart disease   - consider a CT heart scan in a couple of years      4) See Dr. Hopson as scheduled in November 2025.     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       BRANDO Guadalupe - NP    [facility]  [facility]  [address], [address]  [address]  [place], [place]  [address]  [place] [address]  Ph: [phone]   Ph [phone]

## 2025-07-24 NOTE — TELEPHONE ENCOUNTER
----- Message from BRANDO Garrido NP sent at 7/24/2025  9:48 AM EDT -----  Hi can you please send patient 7 day monitor for afib.   Thank you  Katherine

## 2025-08-13 ENCOUNTER — OFFICE VISIT (OUTPATIENT)
Age: 65
End: 2025-08-13
Payer: OTHER GOVERNMENT

## 2025-08-13 VITALS
OXYGEN SATURATION: 100 % | SYSTOLIC BLOOD PRESSURE: 140 MMHG | HEART RATE: 86 BPM | BODY MASS INDEX: 31.01 KG/M2 | RESPIRATION RATE: 14 BRPM | HEIGHT: 63 IN | DIASTOLIC BLOOD PRESSURE: 88 MMHG | WEIGHT: 175 LBS

## 2025-08-13 DIAGNOSIS — R42 DIZZINESS: ICD-10-CM

## 2025-08-13 DIAGNOSIS — I48.0 PAROXYSMAL ATRIAL FIBRILLATION WITH RVR (HCC): ICD-10-CM

## 2025-08-13 DIAGNOSIS — R06.02 SOB (SHORTNESS OF BREATH): ICD-10-CM

## 2025-08-13 DIAGNOSIS — I48.0 PAROXYSMAL ATRIAL FIBRILLATION (HCC): Primary | ICD-10-CM

## 2025-08-13 PROCEDURE — 99205 OFFICE O/P NEW HI 60 MIN: CPT | Performed by: HOSPITALIST

## 2025-08-13 PROCEDURE — 93005 ELECTROCARDIOGRAM TRACING: CPT | Performed by: HOSPITALIST

## 2025-08-13 PROCEDURE — 93010 ELECTROCARDIOGRAM REPORT: CPT | Performed by: HOSPITALIST

## 2025-08-18 ENCOUNTER — TELEPHONE (OUTPATIENT)
Age: 65
End: 2025-08-18

## 2025-08-21 ENCOUNTER — TELEPHONE (OUTPATIENT)
Age: 65
End: 2025-08-21

## 2025-08-21 ENCOUNTER — PATIENT MESSAGE (OUTPATIENT)
Age: 65
End: 2025-08-21

## 2025-08-22 ENCOUNTER — TELEPHONE (OUTPATIENT)
Age: 65
End: 2025-08-22

## (undated) DEVICE — BAG BELONG PT PERS CLEAR HANDL

## (undated) DEVICE — 1200 GUARD II KIT W/5MM TUBE W/O VAC TUBE: Brand: GUARDIAN

## (undated) DEVICE — Device

## (undated) DEVICE — SENSOR SPO2 NELLCOR FLX REUSE --

## (undated) DEVICE — CANN NASAL O2 CAPNOGRAPHY AD -- FILTERLINE

## (undated) DEVICE — CONTAINER SPEC 20 ML LID NEUT BUFF FORMALIN 10 % POLYPR STS

## (undated) DEVICE — SYRINGE MED 20ML STD CLR PLAS LUERSLIP TIP N CTRL DISP

## (undated) DEVICE — KIT COLON W/ 1.1OZ LUB AND 2 END

## (undated) DEVICE — BAG SPEC BIOHZRD 10 X 10 IN --

## (undated) DEVICE — FORCEPS BX L240CM JAW DIA2.8MM L CAP W/ NDL MIC MESH TOOTH

## (undated) DEVICE — CATH IV AUTOGRD BC PNK 20GA 25 -- INSYTE

## (undated) DEVICE — SET ADMIN 16ML TBNG L100IN 2 Y INJ SITE IV PIGGY BK DISP

## (undated) DEVICE — SYR 3ML LL TIP 1/10ML GRAD --

## (undated) DEVICE — SIMPLICITY FLUFF UNDERPAD 23X36, MODERATE: Brand: SIMPLICITY

## (undated) DEVICE — ELECTRODE,RADIOTRANSLUCENT,FOAM,3PK: Brand: MEDLINE

## (undated) DEVICE — SOLIDIFIER MEDC 1200ML -- CONVERT TO 356117